# Patient Record
Sex: MALE | Race: WHITE | Employment: OTHER | URBAN - METROPOLITAN AREA
[De-identification: names, ages, dates, MRNs, and addresses within clinical notes are randomized per-mention and may not be internally consistent; named-entity substitution may affect disease eponyms.]

---

## 2022-01-05 ENCOUNTER — HOSPITAL ENCOUNTER (INPATIENT)
Age: 77
LOS: 7 days | Discharge: HOME | DRG: 177 | End: 2022-01-12
Attending: EMERGENCY MEDICINE | Admitting: HOSPITALIST
Payer: MEDICARE

## 2022-01-05 ENCOUNTER — APPOINTMENT (OUTPATIENT)
Dept: GENERAL RADIOLOGY | Age: 77
DRG: 177 | End: 2022-01-05
Attending: EMERGENCY MEDICINE
Payer: MEDICARE

## 2022-01-05 DIAGNOSIS — J96.01 ACUTE RESPIRATORY FAILURE WITH HYPOXIA (HCC): Primary | ICD-10-CM

## 2022-01-05 DIAGNOSIS — J12.82 PNEUMONIA DUE TO COVID-19 VIRUS: ICD-10-CM

## 2022-01-05 DIAGNOSIS — U07.1 PNEUMONIA DUE TO COVID-19 VIRUS: ICD-10-CM

## 2022-01-05 PROBLEM — E87.1 HYPONATREMIA: Status: ACTIVE | Noted: 2022-01-05

## 2022-01-05 PROBLEM — E87.6 HYPOKALEMIA: Status: ACTIVE | Noted: 2022-01-05

## 2022-01-05 PROBLEM — D69.6 THROMBOCYTOPENIA (HCC): Status: ACTIVE | Noted: 2022-01-05

## 2022-01-05 LAB
ALBUMIN SERPL-MCNC: 2.3 G/DL (ref 3.2–4.6)
ALBUMIN/GLOB SERPL: 0.6 {RATIO} (ref 1.2–3.5)
ALP SERPL-CCNC: 45 U/L (ref 50–136)
ALT SERPL-CCNC: 50 U/L (ref 12–65)
ANION GAP SERPL CALC-SCNC: 6 MMOL/L (ref 7–16)
AST SERPL-CCNC: 87 U/L (ref 15–37)
BASOPHILS # BLD: 0 K/UL (ref 0–0.2)
BASOPHILS NFR BLD: 0 % (ref 0–2)
BILIRUB SERPL-MCNC: 0.7 MG/DL (ref 0.2–1.1)
BUN SERPL-MCNC: 16 MG/DL (ref 8–23)
CALCIUM SERPL-MCNC: 7.9 MG/DL (ref 8.3–10.4)
CHLORIDE SERPL-SCNC: 105 MMOL/L (ref 98–107)
CO2 SERPL-SCNC: 23 MMOL/L (ref 21–32)
COVID-19 RAPID TEST, COVR: DETECTED
CREAT SERPL-MCNC: 0.96 MG/DL (ref 0.8–1.5)
CRP SERPL HS-MCNC: 87.6 MG/L
CRP SERPL-MCNC: 9 MG/DL (ref 0–0.9)
DIFFERENTIAL METHOD BLD: ABNORMAL
EOSINOPHIL # BLD: 0 K/UL (ref 0–0.8)
EOSINOPHIL NFR BLD: 0 % (ref 0.5–7.8)
ERYTHROCYTE [DISTWIDTH] IN BLOOD BY AUTOMATED COUNT: 12.4 % (ref 11.9–14.6)
GLOBULIN SER CALC-MCNC: 4 G/DL (ref 2.3–3.5)
GLUCOSE SERPL-MCNC: 106 MG/DL (ref 65–100)
HCT VFR BLD AUTO: 41.8 % (ref 41.1–50.3)
HGB BLD-MCNC: 14.6 G/DL (ref 13.6–17.2)
IMM GRANULOCYTES # BLD AUTO: 0 K/UL (ref 0–0.5)
IMM GRANULOCYTES NFR BLD AUTO: 1 % (ref 0–5)
LACTATE SERPL-SCNC: 1.7 MMOL/L (ref 0.4–2)
LYMPHOCYTES # BLD: 1.1 K/UL (ref 0.5–4.6)
LYMPHOCYTES NFR BLD: 18 % (ref 13–44)
MAGNESIUM SERPL-MCNC: 2.8 MG/DL (ref 1.8–2.4)
MCH RBC QN AUTO: 29.7 PG (ref 26.1–32.9)
MCHC RBC AUTO-ENTMCNC: 34.9 G/DL (ref 31.4–35)
MCV RBC AUTO: 85 FL (ref 79.6–97.8)
MONOCYTES # BLD: 0.7 K/UL (ref 0.1–1.3)
MONOCYTES NFR BLD: 12 % (ref 4–12)
NEUTS SEG # BLD: 4.3 K/UL (ref 1.7–8.2)
NEUTS SEG NFR BLD: 69 % (ref 43–78)
NRBC # BLD: 0 K/UL (ref 0–0.2)
PLATELET # BLD AUTO: 104 K/UL (ref 150–450)
PMV BLD AUTO: 11.5 FL (ref 9.4–12.3)
POTASSIUM SERPL-SCNC: 3.3 MMOL/L (ref 3.5–5.1)
PROCALCITONIN SERPL-MCNC: 0.15 NG/ML (ref 0–0.49)
PROT SERPL-MCNC: 6.3 G/DL (ref 6.3–8.2)
RBC # BLD AUTO: 4.92 M/UL (ref 4.23–5.6)
SODIUM SERPL-SCNC: 134 MMOL/L (ref 138–145)
SOURCE, COVRS: ABNORMAL
WBC # BLD AUTO: 6.2 K/UL (ref 4.3–11.1)

## 2022-01-05 PROCEDURE — 84145 PROCALCITONIN (PCT): CPT

## 2022-01-05 PROCEDURE — 74011000250 HC RX REV CODE- 250: Performed by: EMERGENCY MEDICINE

## 2022-01-05 PROCEDURE — 86140 C-REACTIVE PROTEIN: CPT

## 2022-01-05 PROCEDURE — 74011250637 HC RX REV CODE- 250/637: Performed by: HOSPITALIST

## 2022-01-05 PROCEDURE — 94640 AIRWAY INHALATION TREATMENT: CPT

## 2022-01-05 PROCEDURE — 71045 X-RAY EXAM CHEST 1 VIEW: CPT

## 2022-01-05 PROCEDURE — 74011000250 HC RX REV CODE- 250: Performed by: HOSPITALIST

## 2022-01-05 PROCEDURE — 99285 EMERGENCY DEPT VISIT HI MDM: CPT

## 2022-01-05 PROCEDURE — 74011250636 HC RX REV CODE- 250/636: Performed by: HOSPITALIST

## 2022-01-05 PROCEDURE — 86141 C-REACTIVE PROTEIN HS: CPT

## 2022-01-05 PROCEDURE — 77010033678 HC OXYGEN DAILY

## 2022-01-05 PROCEDURE — XW0DXM6 INTRODUCTION OF BARICITINIB INTO MOUTH AND PHARYNX, EXTERNAL APPROACH, NEW TECHNOLOGY GROUP 6: ICD-10-PCS | Performed by: FAMILY MEDICINE

## 2022-01-05 PROCEDURE — 83605 ASSAY OF LACTIC ACID: CPT

## 2022-01-05 PROCEDURE — 94664 DEMO&/EVAL PT USE INHALER: CPT

## 2022-01-05 PROCEDURE — 85025 COMPLETE CBC W/AUTO DIFF WBC: CPT

## 2022-01-05 PROCEDURE — 65270000029 HC RM PRIVATE

## 2022-01-05 PROCEDURE — 96374 THER/PROPH/DIAG INJ IV PUSH: CPT

## 2022-01-05 PROCEDURE — 83735 ASSAY OF MAGNESIUM: CPT

## 2022-01-05 PROCEDURE — 80053 COMPREHEN METABOLIC PANEL: CPT

## 2022-01-05 PROCEDURE — 74011000258 HC RX REV CODE- 258: Performed by: HOSPITALIST

## 2022-01-05 PROCEDURE — 87635 SARS-COV-2 COVID-19 AMP PRB: CPT

## 2022-01-05 PROCEDURE — 74011250636 HC RX REV CODE- 250/636: Performed by: EMERGENCY MEDICINE

## 2022-01-05 RX ORDER — ONDANSETRON 4 MG/1
4 TABLET, ORALLY DISINTEGRATING ORAL
Status: DISCONTINUED | OUTPATIENT
Start: 2022-01-05 | End: 2022-01-12 | Stop reason: HOSPADM

## 2022-01-05 RX ORDER — ACETAMINOPHEN 650 MG/1
650 SUPPOSITORY RECTAL
Status: DISCONTINUED | OUTPATIENT
Start: 2022-01-05 | End: 2022-01-12 | Stop reason: HOSPADM

## 2022-01-05 RX ORDER — DEXAMETHASONE 4 MG/1
6 TABLET ORAL DAILY
Status: DISCONTINUED | OUTPATIENT
Start: 2022-01-06 | End: 2022-01-12 | Stop reason: HOSPADM

## 2022-01-05 RX ORDER — ENOXAPARIN SODIUM 100 MG/ML
30 INJECTION SUBCUTANEOUS EVERY 12 HOURS
Status: DISCONTINUED | OUTPATIENT
Start: 2022-01-05 | End: 2022-01-05

## 2022-01-05 RX ORDER — GUAIFENESIN/DEXTROMETHORPHAN 100-10MG/5
5 SYRUP ORAL
Status: DISCONTINUED | OUTPATIENT
Start: 2022-01-05 | End: 2022-01-12 | Stop reason: HOSPADM

## 2022-01-05 RX ORDER — SODIUM CHLORIDE 0.9 % (FLUSH) 0.9 %
5-40 SYRINGE (ML) INJECTION EVERY 8 HOURS
Status: DISCONTINUED | OUTPATIENT
Start: 2022-01-05 | End: 2022-01-12 | Stop reason: HOSPADM

## 2022-01-05 RX ORDER — POLYETHYLENE GLYCOL 3350 17 G/17G
17 POWDER, FOR SOLUTION ORAL DAILY PRN
Status: DISCONTINUED | OUTPATIENT
Start: 2022-01-05 | End: 2022-01-12 | Stop reason: HOSPADM

## 2022-01-05 RX ORDER — DEXAMETHASONE SODIUM PHOSPHATE 100 MG/10ML
10 INJECTION INTRAMUSCULAR; INTRAVENOUS
Status: COMPLETED | OUTPATIENT
Start: 2022-01-05 | End: 2022-01-05

## 2022-01-05 RX ORDER — AZITHROMYCIN 250 MG/1
250 TABLET, FILM COATED ORAL DAILY
Status: COMPLETED | OUTPATIENT
Start: 2022-01-06 | End: 2022-01-09

## 2022-01-05 RX ORDER — FAMOTIDINE 20 MG/1
20 TABLET, FILM COATED ORAL 2 TIMES DAILY
Status: DISCONTINUED | OUTPATIENT
Start: 2022-01-05 | End: 2022-01-09

## 2022-01-05 RX ORDER — ONDANSETRON 2 MG/ML
4 INJECTION INTRAMUSCULAR; INTRAVENOUS
Status: DISCONTINUED | OUTPATIENT
Start: 2022-01-05 | End: 2022-01-12 | Stop reason: HOSPADM

## 2022-01-05 RX ORDER — ENOXAPARIN SODIUM 100 MG/ML
40 INJECTION SUBCUTANEOUS EVERY 24 HOURS
Status: DISCONTINUED | OUTPATIENT
Start: 2022-01-05 | End: 2022-01-12 | Stop reason: HOSPADM

## 2022-01-05 RX ORDER — IPRATROPIUM BROMIDE AND ALBUTEROL SULFATE 2.5; .5 MG/3ML; MG/3ML
3 SOLUTION RESPIRATORY (INHALATION)
Status: COMPLETED | OUTPATIENT
Start: 2022-01-05 | End: 2022-01-05

## 2022-01-05 RX ORDER — AZITHROMYCIN 250 MG/1
500 TABLET, FILM COATED ORAL ONCE
Status: COMPLETED | OUTPATIENT
Start: 2022-01-05 | End: 2022-01-05

## 2022-01-05 RX ORDER — SODIUM CHLORIDE 0.9 % (FLUSH) 0.9 %
5-40 SYRINGE (ML) INJECTION AS NEEDED
Status: DISCONTINUED | OUTPATIENT
Start: 2022-01-05 | End: 2022-01-12 | Stop reason: HOSPADM

## 2022-01-05 RX ORDER — ACETAMINOPHEN 325 MG/1
650 TABLET ORAL
Status: DISCONTINUED | OUTPATIENT
Start: 2022-01-05 | End: 2022-01-12 | Stop reason: HOSPADM

## 2022-01-05 RX ORDER — POTASSIUM CHLORIDE 20 MEQ/1
40 TABLET, EXTENDED RELEASE ORAL
Status: COMPLETED | OUTPATIENT
Start: 2022-01-05 | End: 2022-01-05

## 2022-01-05 RX ADMIN — CEFTRIAXONE 2 G: 2 INJECTION, POWDER, FOR SOLUTION INTRAMUSCULAR; INTRAVENOUS at 15:06

## 2022-01-05 RX ADMIN — IPRATROPIUM BROMIDE AND ALBUTEROL SULFATE 3 ML: .5; 3 SOLUTION RESPIRATORY (INHALATION) at 10:54

## 2022-01-05 RX ADMIN — FAMOTIDINE 20 MG: 20 TABLET ORAL at 17:47

## 2022-01-05 RX ADMIN — ENOXAPARIN SODIUM 40 MG: 100 INJECTION SUBCUTANEOUS at 16:14

## 2022-01-05 RX ADMIN — DEXAMETHASONE SODIUM PHOSPHATE 10 MG: 10 INJECTION INTRAMUSCULAR; INTRAVENOUS at 11:26

## 2022-01-05 RX ADMIN — POTASSIUM CHLORIDE 40 MEQ: 20 TABLET, EXTENDED RELEASE ORAL at 15:08

## 2022-01-05 RX ADMIN — SODIUM CHLORIDE, PRESERVATIVE FREE 10 ML: 5 INJECTION INTRAVENOUS at 15:08

## 2022-01-05 RX ADMIN — AZITHROMYCIN MONOHYDRATE 500 MG: 250 TABLET ORAL at 15:07

## 2022-01-05 RX ADMIN — BARICITINIB 4 MG: 2 TABLET, FILM COATED ORAL at 15:08

## 2022-01-05 RX ADMIN — SODIUM CHLORIDE, PRESERVATIVE FREE 10 ML: 5 INJECTION INTRAVENOUS at 21:36

## 2022-01-05 NOTE — ED TRIAGE NOTES
Pt arrived via EMS from Emergency [facility] with c/o shob and cough. COVID+ x 3 weeks. SpO 88% on RA. 96% on 6L. Temp 101.6 Pt was give tylenol and motrin.  EMS gave 1L NS. /86   Pt was dx with COVID Pna.

## 2022-01-05 NOTE — H&P
Hospitalist History and Physical   Admit Date:  2022 10:11 AM   Name:  Kartik Blackburn   Age:  68 y.o. Sex:  male  :  1945   MRN:  [id number]   [facility]    Presenting Complaint: Positive For Covid-19    Reason(s) for Admission: Acute hypoxemic respiratory failure due to COVID-19 ([facility] [U07.1, J96.01]     History of Present Illness:   Kartik Blackburn is a 68 y.o. male with no significant prior past medical history who presented to the ER from the emergency MD via EMS as he was found to be hypoxic with oxygen saturation of low 80s on room air with no history of prior lung disease. Patient reports he was diagnosed with COVID-19 infection on . He has not been vaccinated against COVID-19. No fever no chills. No chest pain no palpitations. He denies any shortness of breath but he was found to be hypoxic at emergency MD.  No nausea no vomiting no abdominal pain. No diarrhea. No tingling numbness or weakness of extremities. Patient states that he sees a primary care physician but has not been diagnosed with any medical problems. Near patient does not take any medications currently for any medical problems. Review of Systems:  10 systems reviewed and negative except as noted in HPI. ER course  Patient was found to be hypoxic and is currently needing 5 L oxygen nasal cannula to maintain saturations greater than 88%. Chest x-ray on admission shows multifocal airspace opacities. CBC remarkable for thrombocytopenia platelet count of 554. CMP remarkable for sodium of 134, potassium 3.3,.  Lactic acid 1.7. Procalcitonin 0.15. CRP 9. Repeat COVID test on admission is positive. Patient admitted for further evaluation management of acute hypoxemic respiratory failure secondary to COVID-19 pneumonia.     Assessment & Plan:     Principal Problem:     Acute hypoxemic respiratory failure due to COVID-19 [facility]) (2022)  Chest x-ray on admission shows multifocal airspace opacities. Patient is currently needing 5 L oxygen nasal cannula to maintain saturations greater than 88%. Not vaccinated against COVID-19. He tested positive for Covid on 12/22/21. Continue dexamethasone. Procalcitonin is 0.15. Due to multifocal opacities will treat with ceftriaxone, azithromycin. Patient meets criteria for baricitinib which was ordered. Check D-dimer. Home oxygen screen prior to discharge. Active Problems:    Hypokalemia (1/5/2022)  Potassium is 3.3. Repleted with p.o. potassium. Check magnesium level. Mild hyponatremia  Sodium of 134. Monitor for now. Thrombocytopenia mild  Platelet count of 862 on admission. No signs of active bleeding. Likely from infection. Monitor for now. Dispo/Discharge Planning:   Medical, inpatient. Anticipate hospital stay for 48 hours. Home oxygen screen prior to discharge. Diet: ADULT DIET Regular  VTE ppx: Lovenox  Code status: Full Code   DPOA: Pt's wife and son,   Pt's son Mr Gibson Line Problems as of 1/5/2022 Never Reviewed          Codes Class Noted - Resolved POA    * (Principal) Acute hypoxemic respiratory failure due to COVID-19 [facility]) ICD-10-CM: U07.1, J96.01  ICD-9-CM: 518.81, 079.89, 799.02  1/5/2022 - Present Unknown        Hypokalemia ICD-10-CM: E87.6  ICD-9-CM: 276.8  1/5/2022 - Present Unknown        Hyponatremia ICD-10-CM: E87.1  ICD-9-CM: 276.1  1/5/2022 - Present Unknown        Thrombocytopenia ([facility] ICD-10-CM: D69.6  ICD-9-CM: 287.5  1/5/2022 - Present Unknown              Past History:  No significant prior past medical history of hypertension or diabetes. No prior surgeries per patient  No past medical history on file. No past surgical history on file.    Allergies   Allergen Reactions    Lipitor [Atorvastatin] Rash      Social History     Tobacco Use    Smoking status: Not on file    Smokeless tobacco: Not on file   Substance Use Topics    Alcohol use: Not on file      No family history on file. Family history reviewed with the patient and no family history of hypertension or diabetes. There is no immunization history on file for this patient. Prior to Admit Medications:  No current outpatient medications    Objective:     Patient Vitals for the past 24 hrs:   Temp Pulse Resp BP SpO2   01/05/22 1055    (!) 118/56 95 %   01/05/22 1040    131/63 94 %   01/05/22 1025    137/63 92 %   01/05/22 1017    135/69 93 %   01/05/22 1013 98.4 °F (36.9 °C) 93 20  93 %     Oxygen Therapy  O2 Sat (%): 95 % (01/05/22 1055)  Pulse via Oximetry: 87 beats per minute (01/05/22 1055)  O2 Device: Nasal cannula (01/05/22 1055)  O2 Flow Rate (L/min): 5 l/min (01/05/22 1055)    Estimated body mass index is 38.74 kg/m² as calculated from the following:    Height as of this encounter: 5' 6\" (1.676 m). Weight as of this encounter: 108.9 kg (240 lb). No intake or output data in the 24 hours ending 01/05/22 1323      Physical Exam:    Blood pressure (!) 118/56, pulse 93, temperature 98.4 °F (36.9 °C), resp. rate 20, height 5' 6\" (1.676 m), weight 108.9 kg (240 lb), SpO2 95 %. General:    Well nourished. No overt distress on 5L Oxygen NC,   Head:  Normocephalic, atraumatic  Eyes:  Sclerae appear normal.  Pupils equally round. ENT:  Nares appear normal, no drainage. Moist oral mucosa  Neck:  No restricted ROM. Trachea midline   CV:   RRR. No m/r/g. No jugular venous distension. Lungs:   CTAB. No wheezing, rhonchi, or rales. Abdomen: Bowel sounds present. Soft, nontender, nondistended. Extremities: No cyanosis or clubbing. No edema  Skin:     No rashes and normal coloration. Warm and dry. Neuro:  CN II-XII grossly intact. Sensation intact. A&Ox3  Psych:  Normal mood and affect.       I have reviewed ordered lab tests and independently visualized imaging below:    Last 24hr Labs:  Recent Results (from the past 24 hour(s))   CBC WITH AUTOMATED DIFF    Collection Time: 01/05/22 10:27 AM   Result Value Ref Range    WBC 6.2 4.3 - 11.1 K/uL    RBC 4.92 4.23 - 5.6 M/uL    HGB 14.6 13.6 - 17.2 g/dL    HCT 41.8 41.1 - 50.3 %    MCV 85.0 79.6 - 97.8 FL    MCH 29.7 26.1 - 32.9 PG    MCHC 34.9 31.4 - 35.0 g/dL    RDW 12.4 11.9 - 14.6 %    PLATELET 999 (L) 159 - 450 K/uL    MPV 11.5 9.4 - 12.3 FL    ABSOLUTE NRBC 0.00 0.0 - 0.2 K/uL    DF AUTOMATED      NEUTROPHILS 69 43 - 78 %    LYMPHOCYTES 18 13 - 44 %    MONOCYTES 12 4.0 - 12.0 %    EOSINOPHILS 0 (L) 0.5 - 7.8 %    BASOPHILS 0 0.0 - 2.0 %    IMMATURE GRANULOCYTES 1 0.0 - 5.0 %    ABS. NEUTROPHILS 4.3 1.7 - 8.2 K/UL    ABS. LYMPHOCYTES 1.1 0.5 - 4.6 K/UL    ABS. MONOCYTES 0.7 0.1 - 1.3 K/UL    ABS. EOSINOPHILS 0.0 0.0 - 0.8 K/UL    ABS. BASOPHILS 0.0 0.0 - 0.2 K/UL    ABS. IMM. GRANS. 0.0 0.0 - 0.5 K/UL   METABOLIC PANEL, COMPREHENSIVE    Collection Time: 01/05/22 10:27 AM   Result Value Ref Range    Sodium 134 (L) 138 - 145 mmol/L    Potassium 3.3 (L) 3.5 - 5.1 mmol/L    Chloride 105 98 - 107 mmol/L    CO2 23 21 - 32 mmol/L    Anion gap 6 (L) 7 - 16 mmol/L    Glucose 106 (H) 65 - 100 mg/dL    BUN 16 8 - 23 MG/DL    Creatinine 0.96 0.8 - 1.5 MG/DL    GFR est AA >60 >60 ml/min/1.73m2    GFR est non-AA >60 >60 ml/min/1.73m2    Calcium 7.9 (L) 8.3 - 10.4 MG/DL    Bilirubin, total 0.7 0.2 - 1.1 MG/DL    ALT (SGPT) 50 12 - 65 U/L    AST (SGOT) 87 (H) 15 - 37 U/L    Alk.  phosphatase 45 (L) 50 - 136 U/L    Protein, total 6.3 6.3 - 8.2 g/dL    Albumin 2.3 (L) 3.2 - 4.6 g/dL    Globulin 4.0 (H) 2.3 - 3.5 g/dL    A-G Ratio 0.6 (L) 1.2 - 3.5     LACTIC ACID    Collection Time: 01/05/22 10:27 AM   Result Value Ref Range    Lactic acid 1.7 0.4 - 2.0 MMOL/L   CRP, HIGH SENSITIVITY    Collection Time: 01/05/22 10:27 AM   Result Value Ref Range    CRP, High sensitivity 87.6 mg/L   PROCALCITONIN    Collection Time: 01/05/22 10:27 AM   Result Value Ref Range    Procalcitonin 0.15 0.00 - 0.49 ng/mL   C REACTIVE PROTEIN, QT    Collection Time: 01/05/22 10:27 AM   Result Value Ref Range    C-Reactive protein 9.0 (H) 0.0 - 0.9 mg/dL   COVID-19 RAPID TEST    Collection Time: 01/05/22 11:28 AM   Result Value Ref Range    Specimen source NASAL      COVID-19 rapid test Detected (AA) NOTD         All Micro Results     Procedure Component Value Units Date/Time    COVID-19 RAPID TEST [[id number]]  (Abnormal) Collected: 01/05/22 1128    Order Status: Completed Specimen: Nasopharyngeal Updated: 01/05/22 1204     Specimen source NASAL        Comment: RAPID ONLY        COVID-19 rapid test Detected        Comment:      The specimen is POSITIVE for SARS-CoV-2, the novel coronavirus associated with COVID-19. This test has been authorized by the FDA under an Emergency Use Authorization (EUA) for use by authorized laboratories. Fact sheet for Healthcare Providers: ConventionSelectable Mediadate.co.nz  Fact sheet for Patients: ivWatchdate.co.nz       Methodology: Isothermal Nucleic Acid Amplification               Other Studies:  XR CHEST PORT    Result Date: 1/5/2022  EXAM: XR CHEST PORT INDICATION: shob COMPARISON: Chest radiograph, 1/5/2022 FINDINGS: The cardiomediastinal silhouette is enlarged, likely accentuated by technique. There are peripheral and bibasilar predominant airspace opacities. No pleural effusion. No pneumothorax. No acute osseous abnormality. Multifocal airspace opacities worrisome for a viral pneumonia. Medications Administered     albuterol-ipratropium (DUO-NEB) 2.5 MG-0.5 MG/3 ML     Admin Date  01/05/2022 Action  Given Dose  3 mL Route  Nebulization Administered By  Jaylin Rawls          dexamethasone (DECADRON) 10 mg/mL injection 10 mg     Admin Date  01/05/2022 Action  Given Dose  10 mg Route  IntraVENous Administered By  Meli Casillas RN                Signed:  Hazel Cardona MD    Part of this note may have been written by using a voice dictation software.   The note has been proof read but may still contain some grammatical/other typographical errors.

## 2022-01-05 NOTE — PROGRESS NOTES
TRANSFER - IN REPORT:    Verbal report received from [facility] (name) on Bebeto Harness  being received from ER (unit) for routine progression of care      Report consisted of patients Situation, Background, Assessment and   Recommendations(SBAR). Information from the following report(s) SBAR, Kardex, Intake/Output, MAR and Recent Results was reviewed with the receiving nurse. Opportunity for questions and clarification was provided. Assessment completed upon patients arrival to unit and care assumed.

## 2022-01-05 NOTE — ED NOTES
TRANSFER - OUT REPORT:    Verbal report given to Nancy Vazquez RN on Lillard Cranker  being transferred to [facility] [phone] for routine progression of care       Report consisted of patients Situation, Background, Assessment and   Recommendations(SBAR). Information from the following report(s) SBAR was reviewed with the receiving nurse. Lines:   Peripheral IV 01/05/22 Right Hand (Active)   Site Assessment Clean, dry, & intact 01/05/22 1022   Phlebitis Assessment 0 01/05/22 1022   Infiltration Assessment 0 01/05/22 1022   Dressing Status Clean, dry, & intact 01/05/22 1022   Dressing Type 4 X 4 01/05/22 1022   Hub Color/Line Status Green 01/05/22 1022        Opportunity for questions and clarification was provided.       Patient transported with:   O2 @ 5 liters

## 2022-01-05 NOTE — PROGRESS NOTES
Problem: Airway Clearance - Ineffective  Goal: Achieve or maintain patent airway  Outcome: Progressing Towards Goal     Problem: Gas Exchange - Impaired  Goal: Absence of hypoxia  Outcome: Progressing Towards Goal  Goal: Promote optimal lung function  Outcome: Progressing Towards Goal     Problem: Breathing Pattern - Ineffective  Goal: Ability to achieve and maintain a regular respiratory rate  Outcome: Progressing Towards Goal     Problem: Body Temperature -  Risk of, Imbalanced  Goal: Ability to maintain a body temperature within defined limits  Outcome: Progressing Towards Goal  Goal: Will regain or maintain usual level of consciousness  Outcome: Progressing Towards Goal  Goal: Complications related to the disease process, condition or treatment will be avoided or minimized  Outcome: Progressing Towards Goal     Problem: Isolation Precautions - Risk of Spread of Infection  Goal: Prevent transmission of infectious organism to others  Outcome: Progressing Towards Goal     Problem: Nutrition Deficits  Goal: Optimize nutrtional status  Outcome: Progressing Towards Goal     Problem: Risk for Fluid Volume Deficit  Goal: Maintain normal heart rhythm  Outcome: Progressing Towards Goal  Goal: Maintain absence of muscle cramping  Outcome: Progressing Towards Goal  Goal: Maintain normal serum potassium, sodium, calcium, phosphorus, and pH  Outcome: Progressing Towards Goal     Problem: Loneliness or Risk for Loneliness  Goal: Demonstrate positive use of time alone when socialization is not possible  Outcome: Progressing Towards Goal     Problem: Fatigue  Goal: Verbalize increase energy and improved vitality  Outcome: Progressing Towards Goal     Problem: Falls - Risk of  Goal: *Absence of Falls  Description: Document Jaqueline Fall Risk and appropriate interventions in the flowsheet.   Outcome: Progressing Towards Goal  Note: Fall Risk Interventions:            Medication Interventions: Teach patient to arise slowly

## 2022-01-05 NOTE — PROGRESS NOTES
01/05/22 1435   Dual Skin Pressure Injury Assessment   Dual Skin Pressure Injury Assessment WDL   Second Care Provider (Based on [address] [place] [address]) Lucas HIGUERA RN    Skin Integumentary   Skin Integumentary (WDL) X    Pressure  Injury Documentation No Pressure Injury Noted-Pressure Ulcer Prevention Initiated   Skin Color Appropriate for ethnicity   Skin Condition/Temp Dry; Warm   Skin Integrity Other (comment)  (small scab on L foot between 4/5th toes)   Nails X   Exceptions to WDL Thick

## 2022-01-05 NOTE — ED PROVIDER NOTES
66-year-old male presenting for worsening shortness of breath. Diagnosed with Covid on about 22 December which was 2-1/2 weeks ago. Began having shortness of breath about a week ago and has been progressively worsening. Went to emergency MD who did a chest x-ray and a Covid swab. Patient was in the low 80s on room air with no history of lung disease. Transported via EMS to the emergency department for further treatment management. Patient was given fluids and supplemental oxygen. The history is provided by the patient. Positive For Covid-19  Max temp prior to arrival:  102  Temp source:  Oral  Severity:  Severe  Onset quality:  Gradual  Duration:  2 weeks  Timing:  Constant  Progression:  Worsening  Chronicity:  New  Relieved by:  Nothing  Worsened by:  Nothing  Ineffective treatments:  None tried  Associated symptoms: congestion and cough         No past medical history on file. No past surgical history on file. No family history on file. Social History     Socioeconomic History    Marital status:      Spouse name: Not on file    Number of children: Not on file    Years of education: Not on file    Highest education level: Not on file   Occupational History    Not on file   Tobacco Use    Smoking status: Not on file    Smokeless tobacco: Not on file   Substance and Sexual Activity    Alcohol use: Not on file    Drug use: Not on file    Sexual activity: Not on file   Other Topics Concern    Not on file   Social History Narrative    Not on file     Social Determinants of Health     Financial Resource Strain:     Difficulty of Paying Living Expenses: Not on file   Food Insecurity:     Worried About Running Out of Food in the Last Year: Not on file    Eugenia of Food in the Last Year: Not on file   Transportation Needs:     Lack of Transportation (Medical): Not on file    Lack of Transportation (Non-Medical):  Not on file   Physical Activity:     Days of Exercise per Week: Not on file    Minutes of Exercise per Session: Not on file   Stress:     Feeling of Stress : Not on file   Social Connections:     Frequency of Communication with Friends and Family: Not on file    Frequency of Social Gatherings with Friends and Family: Not on file    Attends Sikhism Services: Not on file    Active Member of Clubs or Organizations: Not on file    Attends Club or Organization Meetings: Not on file    Marital Status: Not on file   Intimate Partner Violence:     Fear of Current or Ex-Partner: Not on file    Emotionally Abused: Not on file    Physically Abused: Not on file    Sexually Abused: Not on file   Housing Stability:     Unable to Pay for Housing in the Last Year: Not on file    Number of [place] in the Last Year: Not on file    Unstable Housing in the Last Year: Not on file         ALLERGIES: Lipitor [atorvastatin]    Review of Systems   Constitutional: Positive for activity change, appetite change, fatigue and fever. HENT: Positive for congestion. Respiratory: Positive for cough and shortness of breath. All other systems reviewed and are negative. Vitals:    01/05/22 1013 01/05/22 1017 01/05/22 1055   BP:  135/69    Pulse: 93     Resp: 20     Temp: 98.4 °F (36.9 °C)     SpO2: 93% 93% 95%   Weight: 108.9 kg (240 lb)     Height: 5' 6\" (1.676 m)              Physical Exam  Vitals and nursing note reviewed. Constitutional:       Appearance: He is well-developed. He is obese. HENT:      Head: Normocephalic and atraumatic. Nose: Congestion present. Eyes:      Conjunctiva/sclera: Conjunctivae normal.      Pupils: Pupils are equal, round, and reactive to light. Cardiovascular:      Rate and Rhythm: Normal rate and regular rhythm. Heart sounds: Normal heart sounds. Pulmonary:      Effort: Pulmonary effort is normal.      Breath sounds: Examination of the right-upper field reveals rales. Examination of the left-upper field reveals rales. Examination of the right-middle field reveals rales. Examination of the left-middle field reveals rales. Examination of the right-lower field reveals rales. Examination of the left-lower field reveals rales. Rales present. Abdominal:      General: Bowel sounds are normal.      Palpations: Abdomen is soft. Musculoskeletal:         General: No deformity. Normal range of motion. Cervical back: Normal range of motion and neck supple. Skin:     General: Skin is warm and dry. Neurological:      Mental Status: He is alert and oriented to person, place, and time. Cranial Nerves: No cranial nerve deficit. Psychiatric:         Behavior: Behavior normal.          MDM  Number of Diagnoses or Management Options  Diagnosis management comments: 27-year-old male presenting for acute shortness of breath. Work-up consistent with COVID-19 pneumonia. Decadron given here in the emergency department. Breathing treatment with some improvement. He is on 6 L nasal cannula with no history of lung disease. He is not vaccinated.        Amount and/or Complexity of Data Reviewed  Clinical lab tests: ordered and reviewed (Results for orders placed or performed during the hospital encounter of 01/05/22  -CBC WITH AUTOMATED DIFF:        Result                      Value             Ref Range           WBC                         6.2               4.3 - 11.1 K*       RBC                         4.92              4.23 - 5.6 M*       HGB                         14.6              13.6 - 17.2 *       HCT                         41.8              41.1 - 50.3 %       MCV                         85.0              79.6 - 97.8 *       MCH                         29.7              26.1 - 32.9 *       MCHC                        34.9              31.4 - 35.0 *       RDW                         12.4              11.9 - 14.6 %       PLATELET                    104 (L)           150 - 450 K/*       MPV                         11.5 9.4 - 12.3 FL       ABSOLUTE NRBC               0.00              0.0 - 0.2 K/*       DF                          AUTOMATED                             NEUTROPHILS                 69                43 - 78 %           LYMPHOCYTES                 18                13 - 44 %           MONOCYTES                   12                4.0 - 12.0 %        EOSINOPHILS                 0 (L)             0.5 - 7.8 %         BASOPHILS                   0                 0.0 - 2.0 %         IMMATURE GRANULOCYTES       1                 0.0 - 5.0 %         ABS. NEUTROPHILS            4.3               1.7 - 8.2 K/*       ABS. LYMPHOCYTES            1.1               0.5 - 4.6 K/*       ABS. MONOCYTES              0.7               0.1 - 1.3 K/*       ABS. EOSINOPHILS            0.0               0.0 - 0.8 K/*       ABS. BASOPHILS              0.0               0.0 - 0.2 K/*       ABS. IMM.  GRANS.            0.0               0.0 - 0.5 K/*  -METABOLIC PANEL, COMPREHENSIVE:        Result                      Value             Ref Range           Sodium                      134 (L)           138 - 145 mm*       Potassium                   3.3 (L)           3.5 - 5.1 mm*       Chloride                    105               98 - 107 mmo*       CO2                         23                21 - 32 mmol*       Anion gap                   6 (L)             7 - 16 mmol/L       Glucose                     106 (H)           65 - 100 mg/*       BUN                         16                8 - 23 MG/DL        Creatinine                  0.96              0.8 - 1.5 MG*       GFR est AA                  >60               >60 ml/min/1*       GFR est non-AA              >60               >60 ml/min/1*       Calcium                     7.9 (L)           8.3 - 10.4 M*       Bilirubin, total            0.7               0.2 - 1.1 MG*       ALT (SGPT)                  50                12 - 65 U/L         AST (SGOT)                  87 (H) 15 - 37 U/L         Alk. phosphatase            45 (L)            50 - 136 U/L        Protein, total              6.3               6.3 - 8.2 g/*       Albumin                     2.3 (L)           3.2 - 4.6 g/*       Globulin                    4.0 (H)           2.3 - 3.5 g/*       A-G Ratio                   0.6 (L)           1.2 - 3.5      -LACTIC ACID:        Result                      Value             Ref Range           Lactic acid                 1.7               0.4 - 2.0 MM*  -CRP, HIGH SENSITIVITY:        Result                      Value             Ref Range           CRP, High sensitivity       87.6              mg/L           )  Tests in the radiology section of CPT®: ordered and reviewed (XR CHEST PORT    Result Date: 1/5/2022  EXAM: XR CHEST PORT INDICATION: shob COMPARISON: Chest radiograph, 1/5/2022 FINDINGS: The cardiomediastinal silhouette is enlarged, likely accentuated by technique. There are peripheral and bibasilar predominant airspace opacities. No pleural effusion. No pneumothorax. No acute osseous abnormality. Multifocal airspace opacities worrisome for a viral pneumonia.      )  Tests in the medicine section of CPT®: ordered and reviewed  Discuss the patient with other providers: yes (Consulting hospitalist for further management.)  Independent visualization of images, tracings, or specimens: yes (Reviewed chest x-ray)    Risk of Complications, Morbidity, and/or Mortality  Presenting problems: high  Diagnostic procedures: high  Management options: high  General comments: I personally reviewed the patient's vital signs, laboratory tests, and/or radiological findings. I discussed these findings with the patient and their significance. I answered all questions and explained that given these findings there is significant concern for increased morbidity and/or mortality without immediate intervention.   As a result, I recommended admission to the hospital, consulted the appropriate service, and transitioned care to that service in improved condition      Patient Progress  Patient progress: stable         Procedures

## 2022-01-06 LAB
ALBUMIN SERPL-MCNC: 2.6 G/DL (ref 3.2–4.6)
ALBUMIN/GLOB SERPL: 0.5 {RATIO} (ref 1.2–3.5)
ALP SERPL-CCNC: 54 U/L (ref 50–136)
ALT SERPL-CCNC: 57 U/L (ref 12–65)
ANION GAP SERPL CALC-SCNC: 9 MMOL/L (ref 7–16)
AST SERPL-CCNC: 85 U/L (ref 15–37)
BASOPHILS # BLD: 0 K/UL (ref 0–0.2)
BASOPHILS NFR BLD: 0 % (ref 0–2)
BILIRUB SERPL-MCNC: 0.5 MG/DL (ref 0.2–1.1)
BUN SERPL-MCNC: 20 MG/DL (ref 8–23)
CALCIUM SERPL-MCNC: 8.9 MG/DL (ref 8.3–10.4)
CHLORIDE SERPL-SCNC: 106 MMOL/L (ref 98–107)
CO2 SERPL-SCNC: 24 MMOL/L (ref 21–32)
CREAT SERPL-MCNC: 1.06 MG/DL (ref 0.8–1.5)
CRP SERPL-MCNC: 12 MG/DL (ref 0–0.9)
D DIMER PPP FEU-MCNC: 1.88 UG/ML(FEU)
DIFFERENTIAL METHOD BLD: ABNORMAL
EOSINOPHIL # BLD: 0 K/UL (ref 0–0.8)
EOSINOPHIL NFR BLD: 0 % (ref 0.5–7.8)
ERYTHROCYTE [DISTWIDTH] IN BLOOD BY AUTOMATED COUNT: 12.6 % (ref 11.9–14.6)
GLOBULIN SER CALC-MCNC: 5.3 G/DL (ref 2.3–3.5)
GLUCOSE SERPL-MCNC: 131 MG/DL (ref 65–100)
HCT VFR BLD AUTO: 48.4 % (ref 41.1–50.3)
HGB BLD-MCNC: 16.4 G/DL (ref 13.6–17.2)
IMM GRANULOCYTES # BLD AUTO: 0 K/UL (ref 0–0.5)
IMM GRANULOCYTES NFR BLD AUTO: 0 % (ref 0–5)
LYMPHOCYTES # BLD: 1.2 K/UL (ref 0.5–4.6)
LYMPHOCYTES NFR BLD: 27 % (ref 13–44)
MCH RBC QN AUTO: 29 PG (ref 26.1–32.9)
MCHC RBC AUTO-ENTMCNC: 33.9 G/DL (ref 31.4–35)
MCV RBC AUTO: 85.5 FL (ref 79.6–97.8)
MONOCYTES # BLD: 0.5 K/UL (ref 0.1–1.3)
MONOCYTES NFR BLD: 11 % (ref 4–12)
NEUTS SEG # BLD: 2.8 K/UL (ref 1.7–8.2)
NEUTS SEG NFR BLD: 62 % (ref 43–78)
NRBC # BLD: 0 K/UL (ref 0–0.2)
PLATELET # BLD AUTO: 126 K/UL (ref 150–450)
PLATELET COMMENTS,PCOM: SLIGHT
PMV BLD AUTO: 11.4 FL (ref 9.4–12.3)
POTASSIUM SERPL-SCNC: 3.8 MMOL/L (ref 3.5–5.1)
PROT SERPL-MCNC: 7.9 G/DL (ref 6.3–8.2)
RBC # BLD AUTO: 5.66 M/UL (ref 4.23–5.6)
RBC MORPH BLD: ABNORMAL
SODIUM SERPL-SCNC: 139 MMOL/L (ref 136–145)
WBC # BLD AUTO: 4.5 K/UL (ref 4.3–11.1)
WBC MORPH BLD: ABNORMAL

## 2022-01-06 PROCEDURE — 74011000250 HC RX REV CODE- 250: Performed by: HOSPITALIST

## 2022-01-06 PROCEDURE — 36415 COLL VENOUS BLD VENIPUNCTURE: CPT

## 2022-01-06 PROCEDURE — 74011250636 HC RX REV CODE- 250/636: Performed by: HOSPITALIST

## 2022-01-06 PROCEDURE — 74011250637 HC RX REV CODE- 250/637: Performed by: HOSPITALIST

## 2022-01-06 PROCEDURE — 97535 SELF CARE MNGMENT TRAINING: CPT

## 2022-01-06 PROCEDURE — 85379 FIBRIN DEGRADATION QUANT: CPT

## 2022-01-06 PROCEDURE — 97165 OT EVAL LOW COMPLEX 30 MIN: CPT

## 2022-01-06 PROCEDURE — 74011000258 HC RX REV CODE- 258: Performed by: HOSPITALIST

## 2022-01-06 PROCEDURE — 97161 PT EVAL LOW COMPLEX 20 MIN: CPT

## 2022-01-06 PROCEDURE — 86140 C-REACTIVE PROTEIN: CPT

## 2022-01-06 PROCEDURE — 80053 COMPREHEN METABOLIC PANEL: CPT

## 2022-01-06 PROCEDURE — 85025 COMPLETE CBC W/AUTO DIFF WBC: CPT

## 2022-01-06 PROCEDURE — 65270000029 HC RM PRIVATE

## 2022-01-06 PROCEDURE — 97530 THERAPEUTIC ACTIVITIES: CPT

## 2022-01-06 RX ORDER — HYDRALAZINE HYDROCHLORIDE 20 MG/ML
10 INJECTION INTRAMUSCULAR; INTRAVENOUS
Status: DISCONTINUED | OUTPATIENT
Start: 2022-01-06 | End: 2022-01-12 | Stop reason: HOSPADM

## 2022-01-06 RX ORDER — SAME BUTANEDISULFONATE/BETAINE 400-600 MG
500 POWDER IN PACKET (EA) ORAL 2 TIMES DAILY
Status: DISCONTINUED | OUTPATIENT
Start: 2022-01-06 | End: 2022-01-12 | Stop reason: HOSPADM

## 2022-01-06 RX ORDER — SODIUM CHLORIDE, SODIUM LACTATE, POTASSIUM CHLORIDE, CALCIUM CHLORIDE 600; 310; 30; 20 MG/100ML; MG/100ML; MG/100ML; MG/100ML
50 INJECTION, SOLUTION INTRAVENOUS CONTINUOUS
Status: DISPENSED | OUTPATIENT
Start: 2022-01-06 | End: 2022-01-06

## 2022-01-06 RX ADMIN — ENOXAPARIN SODIUM 40 MG: 100 INJECTION SUBCUTANEOUS at 14:27

## 2022-01-06 RX ADMIN — DEXAMETHASONE 6 MG: 4 TABLET ORAL at 08:02

## 2022-01-06 RX ADMIN — FAMOTIDINE 20 MG: 20 TABLET ORAL at 16:59

## 2022-01-06 RX ADMIN — GUAIFENESIN SYRUP AND DEXTROMETHORPHAN 5 ML: 100; 10 SYRUP ORAL at 14:45

## 2022-01-06 RX ADMIN — Medication 500 MG: at 10:09

## 2022-01-06 RX ADMIN — SODIUM CHLORIDE, SODIUM LACTATE, POTASSIUM CHLORIDE, AND CALCIUM CHLORIDE 50 ML/HR: 600; 310; 30; 20 INJECTION, SOLUTION INTRAVENOUS at 10:08

## 2022-01-06 RX ADMIN — BARICITINIB 4 MG: 2 TABLET, FILM COATED ORAL at 08:02

## 2022-01-06 RX ADMIN — CEFTRIAXONE 2 G: 2 INJECTION, POWDER, FOR SOLUTION INTRAMUSCULAR; INTRAVENOUS at 13:03

## 2022-01-06 RX ADMIN — Medication 500 MG: at 16:59

## 2022-01-06 RX ADMIN — SODIUM CHLORIDE, PRESERVATIVE FREE 10 ML: 5 INJECTION INTRAVENOUS at 15:12

## 2022-01-06 RX ADMIN — SODIUM CHLORIDE, PRESERVATIVE FREE 10 ML: 5 INJECTION INTRAVENOUS at 20:29

## 2022-01-06 RX ADMIN — SODIUM CHLORIDE, PRESERVATIVE FREE 10 ML: 5 INJECTION INTRAVENOUS at 06:14

## 2022-01-06 RX ADMIN — FAMOTIDINE 20 MG: 20 TABLET ORAL at 08:03

## 2022-01-06 RX ADMIN — AZITHROMYCIN MONOHYDRATE 250 MG: 250 TABLET ORAL at 08:03

## 2022-01-06 NOTE — PROGRESS NOTES
Hospitalist Progress Note   Admit Date:  2022 10:11 AM   Name:  Grady Records   Age:  68 y.o. Sex:  male  :  1945   MRN:  [id number]   Room:  [facility]    Presenting Complaint: Positive For Covid-19    Reason(s) for Admission: Acute hypoxemic respiratory failure due to COVID-19 ([facility] [U07.1, J96.01]     Hospital Course & Interval History: This is a 14-year-old male with no significant prior past medical history presented to ER from emergency MD via EMS as he was found hypoxic with oxygen saturation of low 80s on room air. He was diagnosed with COVID-19 infection on . Patient was admitted for acute hypoxemic respiratory failure secondary to COVID-19 pneumonia. No fever no chills. Subjective (22): Patient is still needing 5 to 6 L oxygen high flow nasal cannula. No chest pain. No fever no chills. No nausea no vomiting. Patient complained of diarrhea this morning. Assessment & Plan:     Principal Problem:    Acute hypoxemic respiratory failure due to COVID-19 [facility]) (2022)    Chest x-ray on admission shows multifocal airspace opacities. Patient is currently needing 5-6 L oxygen nasal cannula to maintain saturations greater than 88%. Not vaccinated against COVID-19. He tested positive for Covid on 21. Continue dexamethasone. Procalcitonin is 0.15. Continue Ceftriaxone, azithromycin, Baricitinib. D-dimer is 1.88  CRP is 12. Home oxygen screen prior to discharge. Ordered gentle fluids due to diarrhea. Hypokalemia (2022)  Resolved     Mild hyponatremia  Resolved.     Thrombocytopenia mild  Platelet count of 027 on admission. This am, 126. No signs of active bleeding. Likely from infection. Monitor for now. Mild transaminitis:  No abdominal pain. Likely from [place] 19. Recheck CMP in am.        Dispo/Discharge Planning:    Anticipate inpatient hospital stay for 48 hours.   Home oxygen screen prior to discharge when oxygen requirements improved. Diet:  ADULT DIET Regular; Low Fat/Low Chol/High Fiber/2 gm Na  DVT PPx: Lovenox  Code status: Full Code    Hospital Problems as of 1/6/2022 Never Reviewed          Codes Class Noted - Resolved POA    * (Principal) Acute hypoxemic respiratory failure due to COVID-19 [facility]) ICD-10-CM: U07.1, J96.01  ICD-9-CM: 518.81, 079.89, 799.02  1/5/2022 - Present Unknown        Hypokalemia ICD-10-CM: E87.6  ICD-9-CM: 276.8  1/5/2022 - Present Unknown        Hyponatremia ICD-10-CM: E87.1  ICD-9-CM: 276.1  1/5/2022 - Present Unknown        Thrombocytopenia ([facility] ICD-10-CM: D69.6  ICD-9-CM: 287.5  1/5/2022 - Present Unknown              Objective:     Patient Vitals for the past 24 hrs:   Temp Pulse Resp BP SpO2   01/06/22 1145 97.6 °F (36.4 °C) 78 18 (!) 142/85 94 %   01/06/22 0800 97.9 °F (36.6 °C) 83 20 (!) 150/84 92 %   01/06/22 0332  68 12 (!) 148/98 96 %   01/05/22 2342 97.5 °F (36.4 °C) 74 13 136/73    01/05/22 1947 97.5 °F (36.4 °C) 80 14 128/78 95 %   01/05/22 1802 (!) 96.5 °F (35.8 °C) 77  (!) 142/86 95 %   01/05/22 1431 97.7 °F (36.5 °C) 79 22 132/76 96 %     Oxygen Therapy  O2 Sat (%): 94 % (01/06/22 1145)  Pulse via Oximetry: 87 beats per minute (01/05/22 1055)  O2 Device: Nasal cannula (01/06/22 1300)  O2 Flow Rate (L/min): 6 l/min (01/06/22 1300)    Estimated body mass index is 38.74 kg/m² as calculated from the following:    Height as of this encounter: 5' 6\" (1.676 m). Weight as of this encounter: 108.9 kg (240 lb). Intake/Output Summary (Last 24 hours) at 1/6/2022 1336  Last data filed at 1/6/2022 0805  Gross per 24 hour   Intake 460 ml   Output    Net 460 ml         Physical Exam:     Blood pressure (!) 142/85, pulse 78, temperature 97.6 °F (36.4 °C), resp. rate 18, height 5' 6\" (1.676 m), weight 108.9 kg (240 lb), SpO2 94 %. General:    Well nourished. No overt distress on 5-6 L HFNC  Head:  Normocephalic, atraumatic  Eyes:  Sclerae appear normal.  Pupils equally round.   ENT:  Nares appear normal, no drainage. Moist oral mucosa  Neck:  No restricted ROM. Trachea midline   CV:   RRR. No m/r/g. No jugular venous distension. Lungs:   CTAB. No wheezing, rhonchi, or rales. Abdomen: Bowel sounds present. Soft, nontender, nondistended. Extremities: No cyanosis or clubbing. No edema  Skin:     No rashes and normal coloration. Warm and dry. Neuro:  CN II-XII grossly intact. Sensation intact. A&Ox3  Psych:  Normal mood and affect. I have reviewed ordered lab tests and independently visualized imaging below:    Recent Labs:  Recent Results (from the past 48 hour(s))   CBC WITH AUTOMATED DIFF    Collection Time: 01/05/22 10:27 AM   Result Value Ref Range    WBC 6.2 4.3 - 11.1 K/uL    RBC 4.92 4.23 - 5.6 M/uL    HGB 14.6 13.6 - 17.2 g/dL    HCT 41.8 41.1 - 50.3 %    MCV 85.0 79.6 - 97.8 FL    MCH 29.7 26.1 - 32.9 PG    MCHC 34.9 31.4 - 35.0 g/dL    RDW 12.4 11.9 - 14.6 %    PLATELET 530 (L) 062 - 450 K/uL    MPV 11.5 9.4 - 12.3 FL    ABSOLUTE NRBC 0.00 0.0 - 0.2 K/uL    DF AUTOMATED      NEUTROPHILS 69 43 - 78 %    LYMPHOCYTES 18 13 - 44 %    MONOCYTES 12 4.0 - 12.0 %    EOSINOPHILS 0 (L) 0.5 - 7.8 %    BASOPHILS 0 0.0 - 2.0 %    IMMATURE GRANULOCYTES 1 0.0 - 5.0 %    ABS. NEUTROPHILS 4.3 1.7 - 8.2 K/UL    ABS. LYMPHOCYTES 1.1 0.5 - 4.6 K/UL    ABS. MONOCYTES 0.7 0.1 - 1.3 K/UL    ABS. EOSINOPHILS 0.0 0.0 - 0.8 K/UL    ABS. BASOPHILS 0.0 0.0 - 0.2 K/UL    ABS. IMM.  GRANS. 0.0 0.0 - 0.5 K/UL   METABOLIC PANEL, COMPREHENSIVE    Collection Time: 01/05/22 10:27 AM   Result Value Ref Range    Sodium 134 (L) 138 - 145 mmol/L    Potassium 3.3 (L) 3.5 - 5.1 mmol/L    Chloride 105 98 - 107 mmol/L    CO2 23 21 - 32 mmol/L    Anion gap 6 (L) 7 - 16 mmol/L    Glucose 106 (H) 65 - 100 mg/dL    BUN 16 8 - 23 MG/DL    Creatinine 0.96 0.8 - 1.5 MG/DL    GFR est AA >60 >60 ml/min/1.73m2    GFR est non-AA >60 >60 ml/min/1.73m2    Calcium 7.9 (L) 8.3 - 10.4 MG/DL    Bilirubin, total 0.7 0.2 - 1.1 MG/DL    ALT (SGPT) 50 12 - 65 U/L    AST (SGOT) 87 (H) 15 - 37 U/L    Alk. phosphatase 45 (L) 50 - 136 U/L    Protein, total 6.3 6.3 - 8.2 g/dL    Albumin 2.3 (L) 3.2 - 4.6 g/dL    Globulin 4.0 (H) 2.3 - 3.5 g/dL    A-G Ratio 0.6 (L) 1.2 - 3.5     LACTIC ACID    Collection Time: 01/05/22 10:27 AM   Result Value Ref Range    Lactic acid 1.7 0.4 - 2.0 MMOL/L   CRP, HIGH SENSITIVITY    Collection Time: 01/05/22 10:27 AM   Result Value Ref Range    CRP, High sensitivity 87.6 mg/L   PROCALCITONIN    Collection Time: 01/05/22 10:27 AM   Result Value Ref Range    Procalcitonin 0.15 0.00 - 0.49 ng/mL   C REACTIVE PROTEIN, QT    Collection Time: 01/05/22 10:27 AM   Result Value Ref Range    C-Reactive protein 9.0 (H) 0.0 - 0.9 mg/dL   MAGNESIUM    Collection Time: 01/05/22 10:27 AM   Result Value Ref Range    Magnesium 2.8 (H) 1.8 - 2.4 mg/dL   COVID-19 RAPID TEST    Collection Time: 01/05/22 11:28 AM   Result Value Ref Range    Specimen source NASAL      COVID-19 rapid test Detected (AA) NOTD     METABOLIC PANEL, COMPREHENSIVE    Collection Time: 01/06/22  6:38 AM   Result Value Ref Range    Sodium 139 136 - 145 mmol/L    Potassium 3.8 3.5 - 5.1 mmol/L    Chloride 106 98 - 107 mmol/L    CO2 24 21 - 32 mmol/L    Anion gap 9 7 - 16 mmol/L    Glucose 131 (H) 65 - 100 mg/dL    BUN 20 8 - 23 MG/DL    Creatinine 1.06 0.8 - 1.5 MG/DL    GFR est AA >60 >60 ml/min/1.73m2    GFR est non-AA >60 >60 ml/min/1.73m2    Calcium 8.9 8.3 - 10.4 MG/DL    Bilirubin, total 0.5 0.2 - 1.1 MG/DL    ALT (SGPT) 57 12 - 65 U/L    AST (SGOT) 85 (H) 15 - 37 U/L    Alk.  phosphatase 54 50 - 136 U/L    Protein, total 7.9 6.3 - 8.2 g/dL    Albumin 2.6 (L) 3.2 - 4.6 g/dL    Globulin 5.3 (H) 2.3 - 3.5 g/dL    A-G Ratio 0.5 (L) 1.2 - 3.5     C REACTIVE PROTEIN, QT    Collection Time: 01/06/22  6:38 AM   Result Value Ref Range    C-Reactive protein 12.0 (H) 0.0 - 0.9 mg/dL   D DIMER    Collection Time: 01/06/22  6:38 AM   Result Value Ref Range    D DIMER 1.88 (H) <0.56 ug/ml(FEU)   CBC WITH AUTOMATED DIFF    Collection Time: 01/06/22  8:48 AM   Result Value Ref Range    WBC 4.5 4.3 - 11.1 K/uL    RBC 5.66 (H) 4.23 - 5.6 M/uL    HGB 16.4 13.6 - 17.2 g/dL    HCT 48.4 41.1 - 50.3 %    MCV 85.5 79.6 - 97.8 FL    MCH 29.0 26.1 - 32.9 PG    MCHC 33.9 31.4 - 35.0 g/dL    RDW 12.6 11.9 - 14.6 %    PLATELET 672 (L) 696 - 450 K/uL    MPV 11.4 9.4 - 12.3 FL    ABSOLUTE NRBC 0.00 0.0 - 0.2 K/uL    NEUTROPHILS 62 43 - 78 %    LYMPHOCYTES 27 13 - 44 %    MONOCYTES 11 4.0 - 12.0 %    EOSINOPHILS 0 (L) 0.5 - 7.8 %    BASOPHILS 0 0.0 - 2.0 %    IMMATURE GRANULOCYTES 0 0.0 - 5.0 %    ABS. NEUTROPHILS 2.8 1.7 - 8.2 K/UL    ABS. LYMPHOCYTES 1.2 0.5 - 4.6 K/UL    ABS. MONOCYTES 0.5 0.1 - 1.3 K/UL    ABS. EOSINOPHILS 0.0 0.0 - 0.8 K/UL    ABS. BASOPHILS 0.0 0.0 - 0.2 K/UL    ABS. IMM. GRANS. 0.0 0.0 - 0.5 K/UL    RBC COMMENTS NORMOCYTIC/NORMOCHROMIC      WBC COMMENTS OCCASIONAL      PLATELET COMMENTS SLIGHT      DF AUTOMATED         All Micro Results     Procedure Component Value Units Date/Time    COVID-19 RAPID TEST [[id number]]  (Abnormal) Collected: 01/05/22 1128    Order Status: Completed Specimen: Nasopharyngeal Updated: 01/05/22 1204     Specimen source NASAL        Comment: RAPID ONLY        COVID-19 rapid test Detected        Comment:      The specimen is POSITIVE for SARS-CoV-2, the novel coronavirus associated with COVID-19. This test has been authorized by the FDA under an Emergency Use Authorization (EUA) for use by authorized laboratories. Fact sheet for Healthcare Providers: ConventionUpdate.co.nz  Fact sheet for Patients: ConventionUpdate.co.nz       Methodology: Isothermal Nucleic Acid Amplification               Other Studies:  No results found.     Current Meds:  Current Facility-Administered Medications   Medication Dose Route Frequency    Saccharomyces boulardii (FLORASTOR) capsule 500 mg  500 mg Oral BID    lactated Ringers infusion  50 mL/hr IntraVENous CONTINUOUS    sodium chloride (NS) flush 5-40 mL  5-40 mL IntraVENous Q8H    sodium chloride (NS) flush 5-40 mL  5-40 mL IntraVENous PRN    acetaminophen (TYLENOL) tablet 650 mg  650 mg Oral Q6H PRN    Or    acetaminophen (TYLENOL) suppository 650 mg  650 mg Rectal Q6H PRN    polyethylene glycol (MIRALAX) packet 17 g  17 g Oral DAILY PRN    ondansetron (ZOFRAN ODT) tablet 4 mg  4 mg Oral Q8H PRN    Or    ondansetron (ZOFRAN) injection 4 mg  4 mg IntraVENous Q6H PRN    famotidine (PEPCID) tablet 20 mg  20 mg Oral BID    guaiFENesin-dextromethorphan (ROBITUSSIN DM) 100-10 mg/5 mL syrup 5 mL  5 mL Oral Q4H PRN    dexAMETHasone (DECADRON) tablet 6 mg  6 mg Oral DAILY    cefTRIAXone (ROCEPHIN) 2 g in 0.9% sodium chloride (MBP/ADV) 50 mL MBP  2 g IntraVENous Q24H    azithromycin (ZITHROMAX) tablet 250 mg  250 mg Oral DAILY    baricitinib (OLUMIANT) tablet 4 mg  4 mg Oral DAILY    enoxaparin (LOVENOX) injection 40 mg  40 mg SubCUTAneous Q24H    influenza vaccine 2021-22 (6 mos+)(PF) (FLUARIX/FLULAVAL/FLUZONE QUAD) injection 0.5 mL  1 Each IntraMUSCular PRIOR TO DISCHARGE       Signed:  Jimenez Torre MD    Part of this note may have been written by using a voice dictation software. The note has been proof read but may still contain some grammatical/other typographical errors.

## 2022-01-06 NOTE — PROGRESS NOTES
's visit via telephone call attempted. The call was unanswered. Chaplains remain available follow-up.      Diana Haines MDIV, [facility]

## 2022-01-06 NOTE — PROGRESS NOTES
ACUTE OT GOALS:  (Developed with and agreed upon by patient and/or caregiver.)  1) Patient will complete lower body bathing and dressing with Mod I and adaptive equipment as needed. 2) Patient will complete toileting with independence. 3) Patient will complete functional transfers with independence and adaptive equipment as needed. 4) Patient will tolerate at least 23 minutes of OT activity with less than 2 rest breaks while maintaining O2 sats >90%. 5) Patient will verbalize at least 3 energy conservation technique to utilize during ADL/IADL.    Timeframe: 7 visits     OCCUPATIONAL THERAPY ASSESSMENT: Initial Assessment, Daily Note and AM    OT Treatment Day # 1    Erica Eldridge is a 68 y.o. male   PRIMARY DIAGNOSIS: Acute hypoxemic respiratory failure due to COVID-19 [facility])  Acute hypoxemic respiratory failure due [facility] COVID[facility] [U07.1, J96.01]       Reason for Referral:    ICD-10: Treatment Diagnosis: Generalized Muscle Weakness (M62.81)  Other lack of cordination (R27.8)  INPATIENT: Payor: MEDICAL [facility] / Plan: [facility] MEDICAL [address] / Product Type: Commerical /   ASSESSMENT:     REHAB RECOMMENDATIONS:   Recommendation to date pending progress:  Setting:   No further skilled therapy    versus home health pending progress  Equipment:    None     PRIOR LEVEL OF FUNCTION:  (Prior to Hospitalization)  INITIAL/CURRENT LEVEL OF FUNCTION:  (Based on today's evaluation)   Bathing:   Independent  Dressing:   Independent  Feeding/Grooming:   Independent  Toileting:   Independent  Functional Mobility:   Independent Bathing:   Contact Guard Assistance  Dressin [address]  Feeding/Grooming:   Set Up  Toileting:   Contact Guard Assistance  Functional Mobility:  [address] for lower level transfers     ASSESSMENT:  Mr. Britta Escobar is a 67 YO R dominant WM admitted from home with increased SOB and oxygen sat levels dropping, found to have COVID-19. BP has been elevated, coughing and been dizzy with movement. PT staff had already worked with pt and reported elevated BPs and desaturating with ambulating in room, so Oxygen was increased from 5 to 6L NC.  RN cautioned OT about stressing pt out, so when pt asked for shower, OT declined, but pt really wants to shower. Completed sponge bath in sitting on EOB then standing for pericare. Doffed dirty underwear but has nothing clean to replace with. Min A to get underwear off his feet in standing d/t dizziness. CGA for steadying assist only really, mostly SBA. Pt SBA to TF to recliner. Asked to use bathroom. Refused urinal. CGA ambulating into BR, voided standing, stood for handwashing. Returned to recliner and left with all needs in reach and B feet elevated. OT educated on energy conservation and pacing of activities with verbal understanding. Needs reinforcement. Also recommended shower chair to conserve energy with showering at home. Pt reports he is considering having his whole bathroom remodeled with a walk in shower and built in seat and his son can do this since he is a builder. Pt is functioning below his baseline, mostly from an activity tolerance standpoint and could benefit from skilled OT while in acute care to address deficits and maximize his safety and independence with self care tasks and functional mobility. May not need any further OT upon DC to home pending progress and ability to wean oxygen. SUBJECTIVE:   Mr. Emily uVong states, \"I get a shower every day and I have not had one in 2 weeks. I really need a shower. \"    SOCIAL HISTORY/LIVING ENVIRONMENT: lives with spouse in 1 level home with 1 step at entrance, no HRs. Pt has 2 T/S combos with GBs and HH nozzle. Son is working on converting master bathroom to walk in shower with built in seat. Independent with ADLs, IADLs, driving, no home oxygen, no falls. No DME owned.   Home Environment: Private residence  # Steps to Enter: 1  One/Two Story Residence: One story  Living Alone: No  Support Systems: Spouse/Significant Other,Other Family Member(s),Child(joy)    OBJECTIVE:     PAIN: VITAL SIGNS: LINES/DRAINS:   Pre Treatment: Pain Screen  Pain Scale 1: Numeric (0 - 10)  Pain Intensity 1: 0  Post Treatment: no complaint of pain Vital Signs  O2 Device: Nasal cannula  O2 Flow Rate (L/min): 6 l/min (PT staff increased d/t dropping with tasks) IV  O2 Device: Nasal cannula     GROSS EVALUATION:  B UEs, R dom Within Functional Limits Abnormal/ Functional Abnormal/ Non-Functional (see comments) Not Tested Comments:   AROM [x] [] [] []    PROM [] [] [] [x]    Strength [x] [] [] []    Balance [] [x] [] []    Posture [x] [] [] []    Sensation [] [x] [] [] B feet with PN   Coordination [x] [] [] []    Tone [] [] [] [x]    Edema [] [] [] [x]    Activity Tolerance [] [x] [] [] Needs rest breaks, especially with coughing spells    [] [] [] []      COGNITION/  PERCEPTION: Intact Impaired   (see comments) Comments:   Orientation X [] x4   Vision [x] [] Wears glasses   Hearing [x] []    Judgment/ Insight [x] []    Attention [x] []    Memory [x] []    Command Following [x] []    Emotional Regulation [x] []     [] []      ACTIVITIES OF DAILY LIVING: I Mod I S SBA CGA Min Mod Max Total NT Comments   BASIC ADLs:              Bathing/ Showering [] [] [] [] [x] [] [] [] [] []    Toileting [] [] [] [] [x] [] [] [] [] []    Dressing [] [] [] [] [x] [] [] [] [] []    Feeding [] [] [x] [] [] [] [] [] [] []    Grooming [] [] [] [] [x] [] [] [] [] []    Personal Device Care [] [] [] [] [] [] [] [] [] []    Functional Mobility [] [] [] [] [x] [] [] [] [] [] No DME   I=Independent, Mod I=Modified Independent, S=Supervision, SBA=Standby Assistance, CGA=Contact Guard Assistance,   Min=Minimal Assistance, Mod=Moderate Assistance, Max=Maximal Assistance, Total=Total Assistance, NT=Not Tested    MOBILITY: I Mod I S SBA CGA Min Mod Max Total  NT x2 Comments:   Supine to sit [] [] [] [x] [] [] [] [] [] [] []    Sit to supine [] [] [] [] [] [] [] [] [] [x] [] Up to recliner for lunch   Sit to stand [] [] [] [] [x] [] [] [] [] [] []    Bed to chair [] [] [] [] [x] [] [] [] [] [] []    I=Independent, Mod I=Modified Independent, S=Supervision, SBA=Standby Assistance, CGA=Contact Guard Assistance,   Min=Minimal Assistance, Mod=Moderate Assistance, Max=Maximal Assistance, Total=Total Assistance, NT=Not [facility] [address] [address]   Daily Activity Inpatient Short Form        How much help from another person does the patient currently need. .. Total A Lot A Little None   1. Putting on and taking off regular lower body clothing? [] 1   [] 2   [x] 3   [] 4   2. Bathing (including washing, rinsing, drying)? [] 1   [] 2   [x] 3   [] 4   3. Toileting, which includes using toilet, bedpan or urinal?   [] 1   [] 2   [x] 3   [] 4   4. Putting on and taking off regular upper body clothing? [] 1   [] 2   [x] 3   [] 4   5. Taking care of personal grooming such as brushing teeth? [] 1   [] 2   [x] 3   [] 4   6. Eating meals? [] 1   [] 2   [] 3   [x] 4   © 2007, Trustees of [facility] [employer], under license to [employer]. All rights reserved     Score:  Initial: 19, completed 01/06/2022 Most Recent: X (Date: -- )   Interpretation of Tool:  Represents activities that are increasingly more difficult (i.e. Bed mobility, Transfers, Gait). PLAN:   FREQUENCY/DURATION: OT Plan of Care: 3 times/week for duration of hospital stay or until stated goals are met, whichever comes first.    PROBLEM LIST:   (Skilled intervention is medically necessary to address:)  1. Decreased ADL/Functional Activities  2. Decreased Activity Tolerance  3. Decreased Balance  4. Decreased Coordination  5. Decreased Transfer Abilities   INTERVENTIONS PLANNED:   (Benefits and precautions of occupational therapy have been discussed with the patient.)  1. Self Care Training  2. Therapeutic Activity  3.  Therapeutic Exercise/HEP  4. Neuromuscular Re-education  5. Education     TREATMENT:     EVALUATION: Low Complexity : (Untimed Charge)    TREATMENT:   ($$ Self Care/Home Management: 38-52 mins    )  Self Care (40 Minutes): Self care including Upper Body Bathing, Lower Body Bathing, Toileting, Upper Body Dressing, Lower Body Dressing, Self Feeding, Grooming, ADL Adaptive Equipment Training, Energy Conservation Training and safety in ADLs to increase independence and decrease level of assistance required.     TREATMENT GRID:  N/A    AFTER TREATMENT POSITION/PRECAUTIONS:  Chair, Needs within reach and RN notified    INTERDISCIPLINARY COLLABORATION:  RN/PCT, PT/PTA, OT/GARZA and RN Case Manager/     TOTAL TREATMENT DURATION:  40 mins  OT Patient Time In/Time Out  Time In: 1130  Time Out: Myles 30, OT    Sindy Thompson MS, OTR/L

## 2022-01-06 NOTE — PROGRESS NOTES
END OF SHIFT NOTE:    Intake/Output  No intake/output data recorded. Voiding: YES  Catheter: NO      Stool:  0 occurrences. Emesis:  0 occurrences. VITAL SIGNS  Patient Vitals for the past 12 hrs:   Temp Pulse Resp BP SpO2   01/06/22 0332  68 12 (!) 148/98 96 %   01/05/22 2342 97.5 °F (36.4 °C) 74 13 136/73    01/05/22 1947 97.5 °F (36.4 °C) 80 14 128/78 95 %       Pain Assessment  Pain 1  Pain Scale 1: Numeric (0 - 10) (01/05/22 2000)  Pain Intensity 1: 0 (01/05/22 2000)  Patient Stated Pain Goal: 0 (01/05/22 2000)    Ambulating  Yes    Additional Information:   - pt resting, no needs voiced at this time    Shift report given to oncoming nurse at the bedside.     Dixon Acevedo RN

## 2022-01-06 NOTE — PROGRESS NOTES
ACUTE PHYSICAL THERAPY GOALS:  (Developed with and agreed upon by patient and/or caregiver. )  LTG:  (1.)Mr. Michelle will move from supine to sit and sit to supine  in bed with INDEPENDENCE within 7 treatment day(s). (2.)Mr. Michelle will transfer from bed to chair and chair to bed with INDEPENDENCE using the least restrictive device within 7 treatment day(s). (3.)Mr. Michelle will ambulate with INDEPENDENCE for 400 feet with the least restrictive device within 7 treatment day(s). (4.)Mr. Michelle will perform standing static and dynamic balance activities x 23 minutes with SUPERVISION to improve safety within 7 treatment day(s). (5.)Mr. Michelle will ambulate and/or perform functional activities for  23 consecutive minutes with stable vital signs and no rests required to improve activity tolerance within 7 treatment days.   ________________________________________________________________________________________________        PHYSICAL THERAPY ASSESSMENT: Initial Assessment and Daily Note PT Treatment Day # 1      Erica Eldridge is a 68 y.o. male   PRIMARY DIAGNOSIS: Acute hypoxemic respiratory failure due to COVID-19 ([facility] [address]  Acute hypoxemic respiratory failure due to COVID-19 ([facility] [place] [address] [U07.1, J96.01]       Reason for Referral:    ICD-10: Treatment Diagnosis: Generalized Muscle Weakness (M62.81)  INPATIENT: Payor: MEDICAL [facility] / Plan: [facility] MEDICAL [address] / Product Type: Commerical /     ASSESSMENT:     REHAB RECOMMENDATIONS:   Recommendation to date pending progress:  Setting:   No further skilled therapy   Equipment:    To Be Determined     PRIOR LEVEL OF FUNCTION:  (Prior to Hospitalization) INITIAL/CURRENT LEVEL OF FUNCTION:  (Most Recently Demonstrated)   Bed Mobility:   Independent  Sit to Stand:   Independent  Transfers:   Independent  Gait/Mobility:   Independent Bed Mobility:   Modified Independent  Sit to Stand:   Supervision  Transfers:   Not tested  Gait/Mobility:   Supervision     ASSESSMENT:  Mr. Taty Molina is a 68year old M who presents C19+ on 5L NC supine in bed with oxygen saturations at 90-93%. Pt does not have a significant PMH. Pt states he lives with his wife in a 1 story home with 1/2 a step to enter and PLOF indep all mobility, gait, ADLs. This date pt performs mobility including bed mobility with Mod I, and STS and gait in room x50ft with supervision. Pt demonstrated slight unsteadiness with gait. Pt desaturated to 83% with after gait in room and required seated rest break to recover to 88-90% and then a supine rest and increase to 6L to increase and maintain above 90-93%. Pt also reported dizziness post mobility and BP seated was 149/88mmHg. RN made aware of changes. Pt presents as functioning below his baseline, with deficits in mobility including transfers, gait, balance, and activity tolerance. Pt will benefit from skilled therapy services to address stated deficits to promote return to highest level of function, independence, and safety. No further PT anticipated at d/c. Will continue to follow. SUBJECTIVE:   Mr. Taty Molina states, \"I have been dizzy. \"    SOCIAL HISTORY/LIVING ENVIRONMENT: lives with wife in 1 story home, 1/2 step to enter. PLOF indep with ADLs, mobility. No DME.   Home Environment: Private residence  # Steps to Enter: 1  One/Two Story Residence: One story  Living Alone: No  Support Systems: Spouse/Significant Other,Other Family Member(s),Child(joy)  OBJECTIVE:     PAIN: VITAL SIGNS: LINES/DRAINS:   Pre Treatment:    Post Treatment: 0 Vital Signs  O2 Device: Nasal cannula  O2 Flow Rate (L/min): 6 l/min IV  O2 Device: Nasal cannula     GROSS EVALUATION:  B LE Within Functional Limits Abnormal/ Functional Abnormal/ Non-Functional (see comments) Not Tested Comments:   AROM [x] [] [] []    PROM [] [] [] []    Strength [x] [] [] []    Balance [] [x] [] [] Unsteady gait in room   Posture [] [] [] []    Sensation [] [] [] [] Coordination [] [] [] []    Tone [] [] [] []    Edema [] [] [] []    Activity Tolerance [] [x] [] [] Does not wear O2 at BL, currently needing 6L    [] [] [] []      COGNITION/  PERCEPTION: Intact Impaired   (see comments) Comments:   Orientation [x] []    Vision [x] []    Hearing [x] []    Command Following [x] []    Safety Awareness [x] []     [] []      MOBILITY: I Mod I S SBA CGA Min Mod Max Total  NT x2 Comments:   Bed Mobility    Rolling [] [x] [] [] [] [] [] [] [] [] []    Supine to Sit [] [x] [] [] [] [] [] [] [] [] []    Scooting [x] [] [] [] [] [] [] [] [] [] []    Sit to Supine [] [x] [] [] [] [] [] [] [] [] []    Transfers    Sit to Stand [] [] [x] [] [] [] [] [] [] [] []    Bed to Chair [] [] [] [] [] [] [] [] [] [x] []    Stand to Sit [] [] [x] [] [] [] [] [] [] [] []    I=Independent, Mod I=Modified Independent, S=Supervision, SBA=Standby Assistance, CGA=Contact Guard Assistance,   Min=Minimal Assistance, Mod=Moderate Assistance, Max=Maximal Assistance, Total=Total Assistance, NT=Not Tested  GAIT: I Mod I S SBA CGA Min Mod Max Total  NT x2 Comments:   Level of Assistance [] [] [x] [] [] [] [] [] [] [] []    Distance 50ft    DME None    Gait Quality Unsteady/WBOS    Weightbearing Status N/A     I=Independent, Mod I=Modified Independent, S=Supervision, SBA=Standby Assistance, CGA=Contact Guard Assistance,   Min=Minimal Assistance, Mod=Moderate Assistance, Max=Maximal Assistance, Total=Total Assistance, NT=Not Tested    [facility] [id number] [facility] Mob[facility] Inpatient Short Form       How much difficulty does the patient currently have. .. Unable A Lot A Little None   1. Turning over in bed (including adjusting bedclothes, sheets and blankets)? [] 1   [] 2   [] 3   [x] 4   2. Sitting down on and standing up from a chair with arms ( e.g., wheelchair, bedside commode, etc.)   [] 1   [] 2   [] 3   [x] 4   3. Moving from lying on back to sitting on the side of the bed?    [] 1   [] 2   [] 3   [x] 4   How much help from another person does the patient currently need. .. Total A Lot A Little None   4. Moving to and from a bed to a chair (including a wheelchair)? [] 1   [] 2   [] 3   [x] 4   5. Need to walk in hospital room? [] 1   [] 2   [] 3   [x] 4   6. Climbing 3-5 steps with a railing? [] 1   [] 2   [] 3   [x] 4   © 2007, Trustees of [address] [place], [place] [id number], under license to [employer]. All rights reserved     Score:  Initial: 24 Most Recent: X (Date: -- )    Interpretation of Tool:  Represents activities that are increasingly more difficult (i.e. Bed mobility, Transfers, Gait). PLAN:   FREQUENCY/DURATION: PT Plan of Care: 2 times/week for duration of hospital stay or until stated goals are met, whichever comes first.    PROBLEM LIST:   (Skilled intervention is medically necessary to address:)  1. Decreased Activity Tolerance  2. Decreased Balance  3. Decreased Gait Ability   INTERVENTIONS PLANNED:   (Benefits and precautions of physical therapy have been discussed with the patient.)  1. Therapeutic Activity  2. Therapeutic Exercise/HEP  3. Neuromuscular Re-education  4. Gait Training  5. Education     TREATMENT:     EVALUATION: Low Complexity : (Untimed Charge)    TREATMENT:   ($$ Therapeutic Activity: 23-37 mins    )  Therapeutic Activity (23 Minutes): Therapeutic activity included Rolling, Supine to Sit, Sit to Supine, Scooting, Transfer Training, Ambulation on level ground, Sitting balance  and Standing balance to improve functional Mobility, Strength and Activity tolerance.     TREATMENT GRID:  N/A    AFTER TREATMENT POSITION/PRECAUTIONS:  Bed, Needs within reach and RN notified    INTERDISCIPLINARY COLLABORATION:  RN/PCT and PT/PTA    TOTAL TREATMENT DURATION:  PT Patient Time In/Time Out  Time In: 1033  Time Out: [facility]

## 2022-01-06 NOTE — PROGRESS NOTES
Problem: Airway Clearance - Ineffective  Goal: Achieve or maintain patent airway  Outcome: Progressing Towards Goal     Problem: Gas Exchange - Impaired  Goal: Absence of hypoxia  Outcome: Progressing Towards Goal  Goal: Promote optimal lung function  Outcome: Progressing Towards Goal     Problem: Breathing Pattern - Ineffective  Goal: Ability to achieve and maintain a regular respiratory rate  Outcome: Progressing Towards Goal     Problem: Body Temperature -  Risk of, Imbalanced  Goal: Ability to maintain a body temperature within defined limits  Outcome: Progressing Towards Goal     Problem: Isolation Precautions - Risk of Spread of Infection  Goal: Prevent transmission of infectious organism to others  Outcome: Progressing Towards Goal     Problem: Nutrition Deficits  Goal: Optimize nutrtional status  Outcome: Progressing Towards Goal     Problem: Risk for Fluid Volume Deficit  Goal: Maintain normal heart rhythm  Outcome: Progressing Towards Goal  Goal: Maintain absence of muscle cramping  Outcome: Progressing Towards Goal  Goal: Maintain normal serum potassium, sodium, calcium, phosphorus, and pH  Outcome: Progressing Towards Goal     Problem: Loneliness or Risk for Loneliness  Goal: Demonstrate positive use of time alone when socialization is not possible  Outcome: Progressing Towards Goal     Problem: Fatigue  Goal: Verbalize increase energy and improved vitality  Outcome: Progressing Towards Goal     Problem: Falls - Risk of  Goal: *Absence of Falls  Description: Document Jaqueline Fall Risk and appropriate interventions in the flowsheet.   Outcome: Progressing Towards Goal  Note: Fall Risk Interventions:            Medication Interventions: Patient to call before getting OOB

## 2022-01-07 PROBLEM — I10 HTN (HYPERTENSION): Status: ACTIVE | Noted: 2022-01-07

## 2022-01-07 LAB
ALBUMIN SERPL-MCNC: 2.3 G/DL (ref 3.2–4.6)
ALBUMIN/GLOB SERPL: 0.5 {RATIO} (ref 1.2–3.5)
ALP SERPL-CCNC: 54 U/L (ref 50–136)
ALT SERPL-CCNC: 59 U/L (ref 12–65)
ANION GAP SERPL CALC-SCNC: 10 MMOL/L (ref 7–16)
AST SERPL-CCNC: 84 U/L (ref 15–37)
BASOPHILS # BLD: 0 K/UL (ref 0–0.2)
BASOPHILS NFR BLD: 0 % (ref 0–2)
BILIRUB SERPL-MCNC: 0.4 MG/DL (ref 0.2–1.1)
BUN SERPL-MCNC: 23 MG/DL (ref 8–23)
CALCIUM SERPL-MCNC: 8.9 MG/DL (ref 8.3–10.4)
CHLORIDE SERPL-SCNC: 107 MMOL/L (ref 98–107)
CO2 SERPL-SCNC: 23 MMOL/L (ref 21–32)
CREAT SERPL-MCNC: 0.92 MG/DL (ref 0.8–1.5)
DIFFERENTIAL METHOD BLD: ABNORMAL
EOSINOPHIL # BLD: 0 K/UL (ref 0–0.8)
EOSINOPHIL NFR BLD: 0 % (ref 0.5–7.8)
ERYTHROCYTE [DISTWIDTH] IN BLOOD BY AUTOMATED COUNT: 13 % (ref 11.9–14.6)
GLOBULIN SER CALC-MCNC: 4.7 G/DL (ref 2.3–3.5)
GLUCOSE SERPL-MCNC: 172 MG/DL (ref 65–100)
HCT VFR BLD AUTO: 46.8 % (ref 41.1–50.3)
HGB BLD-MCNC: 16 G/DL (ref 13.6–17.2)
IMM GRANULOCYTES # BLD AUTO: 0.1 K/UL (ref 0–0.5)
IMM GRANULOCYTES NFR BLD AUTO: 1 % (ref 0–5)
LYMPHOCYTES # BLD: 1.5 K/UL (ref 0.5–4.6)
LYMPHOCYTES NFR BLD: 14 % (ref 13–44)
MCH RBC QN AUTO: 29.6 PG (ref 26.1–32.9)
MCHC RBC AUTO-ENTMCNC: 34.2 G/DL (ref 31.4–35)
MCV RBC AUTO: 86.5 FL (ref 79.6–97.8)
MONOCYTES # BLD: 0.7 K/UL (ref 0.1–1.3)
MONOCYTES NFR BLD: 7 % (ref 4–12)
NEUTS SEG # BLD: 7.9 K/UL (ref 1.7–8.2)
NEUTS SEG NFR BLD: 78 % (ref 43–78)
NRBC # BLD: 0 K/UL (ref 0–0.2)
PLATELET # BLD AUTO: 151 K/UL (ref 150–450)
PMV BLD AUTO: 12 FL (ref 9.4–12.3)
POTASSIUM SERPL-SCNC: 4.6 MMOL/L (ref 3.5–5.1)
PROT SERPL-MCNC: 7 G/DL (ref 6.3–8.2)
RBC # BLD AUTO: 5.41 M/UL (ref 4.23–5.6)
SODIUM SERPL-SCNC: 140 MMOL/L (ref 136–145)
WBC # BLD AUTO: 10.2 K/UL (ref 4.3–11.1)

## 2022-01-07 PROCEDURE — 36415 COLL VENOUS BLD VENIPUNCTURE: CPT

## 2022-01-07 PROCEDURE — 65270000029 HC RM PRIVATE

## 2022-01-07 PROCEDURE — 74011250637 HC RX REV CODE- 250/637: Performed by: FAMILY MEDICINE

## 2022-01-07 PROCEDURE — 85025 COMPLETE CBC W/AUTO DIFF WBC: CPT

## 2022-01-07 PROCEDURE — 74011000250 HC RX REV CODE- 250: Performed by: HOSPITALIST

## 2022-01-07 PROCEDURE — 80053 COMPREHEN METABOLIC PANEL: CPT

## 2022-01-07 PROCEDURE — 74011250637 HC RX REV CODE- 250/637: Performed by: HOSPITALIST

## 2022-01-07 PROCEDURE — 74011000258 HC RX REV CODE- 258: Performed by: HOSPITALIST

## 2022-01-07 PROCEDURE — 74011250636 HC RX REV CODE- 250/636: Performed by: HOSPITALIST

## 2022-01-07 RX ORDER — LISINOPRIL 5 MG/1
10 TABLET ORAL DAILY
Status: DISCONTINUED | OUTPATIENT
Start: 2022-01-07 | End: 2022-01-12 | Stop reason: HOSPADM

## 2022-01-07 RX ORDER — AMLODIPINE BESYLATE 10 MG/1
10 TABLET ORAL DAILY
Status: DISCONTINUED | OUTPATIENT
Start: 2022-01-07 | End: 2022-01-12 | Stop reason: HOSPADM

## 2022-01-07 RX ADMIN — Medication 500 MG: at 09:01

## 2022-01-07 RX ADMIN — SODIUM CHLORIDE, PRESERVATIVE FREE 10 ML: 5 INJECTION INTRAVENOUS at 05:54

## 2022-01-07 RX ADMIN — ACETAMINOPHEN 650 MG: 325 TABLET ORAL at 09:01

## 2022-01-07 RX ADMIN — Medication 500 MG: at 17:24

## 2022-01-07 RX ADMIN — FAMOTIDINE 20 MG: 20 TABLET ORAL at 17:24

## 2022-01-07 RX ADMIN — FAMOTIDINE 20 MG: 20 TABLET ORAL at 09:01

## 2022-01-07 RX ADMIN — GUAIFENESIN SYRUP AND DEXTROMETHORPHAN 5 ML: 100; 10 SYRUP ORAL at 20:03

## 2022-01-07 RX ADMIN — AZITHROMYCIN MONOHYDRATE 250 MG: 250 TABLET ORAL at 09:01

## 2022-01-07 RX ADMIN — SODIUM CHLORIDE, PRESERVATIVE FREE 10 ML: 5 INJECTION INTRAVENOUS at 13:27

## 2022-01-07 RX ADMIN — BARICITINIB 4 MG: 2 TABLET, FILM COATED ORAL at 09:00

## 2022-01-07 RX ADMIN — SODIUM CHLORIDE, PRESERVATIVE FREE 10 ML: 5 INJECTION INTRAVENOUS at 22:49

## 2022-01-07 RX ADMIN — LISINOPRIL 10 MG: 5 TABLET ORAL at 13:26

## 2022-01-07 RX ADMIN — ACETAMINOPHEN 650 MG: 325 TABLET ORAL at 22:48

## 2022-01-07 RX ADMIN — AMLODIPINE BESYLATE 10 MG: 10 TABLET ORAL at 13:26

## 2022-01-07 RX ADMIN — GUAIFENESIN SYRUP AND DEXTROMETHORPHAN 5 ML: 100; 10 SYRUP ORAL at 09:00

## 2022-01-07 RX ADMIN — HYDRALAZINE HYDROCHLORIDE 10 MG: 20 INJECTION INTRAMUSCULAR; INTRAVENOUS at 05:51

## 2022-01-07 RX ADMIN — CEFTRIAXONE 2 G: 2 INJECTION, POWDER, FOR SOLUTION INTRAMUSCULAR; INTRAVENOUS at 13:27

## 2022-01-07 RX ADMIN — DEXAMETHASONE 6 MG: 4 TABLET ORAL at 09:01

## 2022-01-07 RX ADMIN — ENOXAPARIN SODIUM 40 MG: 100 INJECTION SUBCUTANEOUS at 13:26

## 2022-01-07 NOTE — ACP (ADVANCE CARE PLANNING)
Advance Care Planning     General Advance Care Planning (ACP) Conversation      Date of Conversation: 1/5/2022  Conducted with: Patient with Decision Making Capacity    Healthcare Decision Maker:     Primary Decision Maker: Fallon Vilas - Spouse - [phone]  Click here to complete [address] including selection of the Healthcare Decision Maker Relationship (ie \"Primary\")          Content/Action Overview:   DECLINED ACP conversation - will revisit periodically   Reviewed DNR/DNI and patient elects Full Code (Attempt Resuscitation)         Length of Voluntary ACP Conversation in minutes:  <16 minutes (Non-Billable)    Kylie Bhakta RN

## 2022-01-07 NOTE — PROGRESS NOTES
Tylenol 650 mg po given for neck pain 5/10 (pt does not want anything stronger than Tylenol for pain) & Robitussin 5 mL po given for c/o coughing.

## 2022-01-07 NOTE — PROGRESS NOTES
Pt states his pain is 1/10 on his neck. States the Tylenol helped & that the Robitussin did help ease his cough.

## 2022-01-07 NOTE — PROGRESS NOTES
Occupational Therapy Note: pt politely declined all therapy attempts this morning, noted to be struggling with breathing while talking on phone with family, face red and supine in bed with HOB elevated. Will check back later as time and schedule allows. Thank you.  Jose Angel Menchaca MS, OTR/L

## 2022-01-07 NOTE — PROGRESS NOTES
Continues to void without difficulty. Reports having small watery, yellow stools, prior to admission for a couple of days, and several while here. Hydralazine 10 mg given IVP slowly for /89.

## 2022-01-07 NOTE — PROGRESS NOTES
Hospitalist Progress Note   Admit Date:  2022 10:11 AM   Name:  Heather Mendoza   Age:  68 y.o. Sex:  male  :  1945   MRN:  [id number]   Room:  [facility]    Presenting Complaint: Positive For Covid-19    Reason(s) for Admission: Acute hypoxemic respiratory failure due to COVID-19 ([facility] [U07.1, J96.01]     Hospital Course & Interval History: This is a 40-year-old male with no significant prior past medical history presented to ER from emergency MD via EMS as he was found hypoxic with oxygen saturation of low 80s on room air. He was diagnosed with COVID-19 infection on . Patient was admitted for acute hypoxemic respiratory failure secondary to COVID-19 pneumonia. No fever no chills. Subjective (22): Complain of his chronic neck pain and dizziness, says has similar episodes at home which last for few hours. Says breathing better, was able to get up and go to restroom. Still requiring around 6 L/min    Assessment & Plan:   Acute hypoxemic respiratory failure due to COVID-19 [facility]) (2022)    Chest x-ray on admission shows multifocal airspace opacities. Patient is currently needing 5-6 L oxygen nasal cannula to maintain saturations greater than 88%. Not vaccinated against COVID-19. [facility] tested positive for Covid on 21. Continue dexamethasone.  Procalcitonin is 0.15.    Continue Ceftriaxone, azithromycin, Baricitinib. D-dimer is 1.88  CRP is 12. Home oxygen screen prior to discharge. Ordered gentle fluids due to diarrhea. 2022  Presently requiring 6 L/min    #Chronic neck pain with dizziness  Says has similar episode at home and last for few hours. Says was told that no other surgery for his neck. #Elevated blood pressure  We will add amlodipine and lisinopril  Continue as needed hydralazine       Hypokalemia (2022)  Resolved     Mild hyponatremia  Resolved.     Thrombocytopenia mild  Platelet count of 640 on admission.  This am, 126.   No signs of active bleeding. Likely from infection.  Monitor for now.     Mild transaminitis:  No abdominal pain. Likely from [facility].       Dispo/Discharge Planning:    Anticipate inpatient hospital stay for 48 hours. Home oxygen screen prior to discharge when oxygen requirements improved.     Diet:  ADULT DIET Regular; Low Fat/Low Chol/High Fiber/2 gm Na  DVT PPx: Lovenox  Code status: Full Code      Hospital Problems as of 1/7/2022 Never Reviewed          Codes Class Noted - Resolved POA    HTN (hypertension) ICD-10-CM: I10  ICD-9-CM: 401.9  1/7/2022 - Present Unknown        * (Principal) Acute hypoxemic respiratory failure due to COVID-19 [facility]) ICD-10-CM: U07.1, J96.01  ICD-9-CM: 518.81, 079.89, 799.02  1/5/2022 - Present Unknown        Hypokalemia ICD-10-CM: E87.6  ICD-9-CM: 276.8  1/5/2022 - Present Unknown        Hyponatremia ICD-10-CM: E87.1  ICD-9-CM: 276.1  1/5/2022 - Present Unknown        Thrombocytopenia ([facility] [address] ICD-10-CM: D69.6  ICD-9-CM: 287.5  1/5/2022 - Present Unknown              Objective:     Patient Vitals for the past 24 hrs:   Temp Pulse Resp BP SpO2   01/07/22 1218 97.4 °F (36.3 °C) 93  (!) 172/97 93 %   01/07/22 0800 98.4 °F (36.9 °C) 98 18 (!) 146/80 91 %   01/07/22 0507 97.6 °F (36.4 °C) (!) 113 20 (!) 172/89 90 %   01/06/22 2352 98.3 °F (36.8 °C) 69 15 (!) 167/90    01/06/22 2000 97.9 °F (36.6 °C) 88 13 (!) 166/88 91 %   01/06/22 1548 97.5 °F (36.4 °C) 78 16 126/73 94 %     Oxygen Therapy  O2 Sat (%): 93 % (01/07/22 1218)  Pulse via Oximetry: 87 beats per minute (01/05/22 1055)  O2 Device: Nasal cannula (01/06/22 2000)  O2 Flow Rate (L/min): 6 l/min (01/06/22 1300)    Estimated body mass index is 38.74 kg/m² as calculated from the following:    Height as of this encounter: 5' 6\" (1.676 m). Weight as of this encounter: 108.9 kg (240 lb).     Intake/Output Summary (Last 24 hours) at 1/7/2022 1311  Last data filed at 1/7/2022 0530  Gross per 24 hour   Intake    Output 250 ml   Net -250 ml Physical Exam:     Blood pressure (!) 172/97, pulse 93, temperature 97.4 °F (36.3 °C), resp. rate 18, height 5' 6\" (1.676 m), weight 108.9 kg (240 lb), SpO2 93 %. General:    Well nourished. Mild respiratory distress. complaining of mild dizziness from his neck pain which he says is chronic. Presently on 6 L/min  Head:  Normocephalic, atraumatic  Eyes:  Sclerae appear normal.  Pupils equally round. ENT:  Nares appear normal, no drainage. Moist oral mucosa  Neck:  No restricted ROM. Trachea midline   CV:   RRR. No m/r/g. No jugular venous distension. Lungs:   Bilateral coarse breath sounds  Abdomen: Bowel sounds present. Soft, nontender, nondistended. Extremities: No cyanosis or clubbing. No edema  Skin:     No rashes and normal coloration. Warm and dry. Neuro:  CN II-XII grossly intact. Sensation intact. A&Ox3  Psych:  Normal mood and affect. I have reviewed ordered lab tests and independently visualized imaging below:    Recent Labs:  Recent Results (from the past 48 hour(s))   METABOLIC PANEL, COMPREHENSIVE    Collection Time: 01/06/22  6:38 AM   Result Value Ref Range    Sodium 139 136 - 145 mmol/L    Potassium 3.8 3.5 - 5.1 mmol/L    Chloride 106 98 - 107 mmol/L    CO2 24 21 - 32 mmol/L    Anion gap 9 7 - 16 mmol/L    Glucose 131 (H) 65 - 100 mg/dL    BUN 20 8 - 23 MG/DL    Creatinine 1.06 0.8 - 1.5 MG/DL    GFR est AA >60 >60 ml/min/1.73m2    GFR est non-AA >60 >60 ml/min/1.73m2    Calcium 8.9 8.3 - 10.4 MG/DL    Bilirubin, total 0.5 0.2 - 1.1 MG/DL    ALT (SGPT) 57 12 - 65 U/L    AST (SGOT) 85 (H) 15 - 37 U/L    Alk.  phosphatase 54 50 - 136 U/L    Protein, total 7.9 6.3 - 8.2 g/dL    Albumin 2.6 (L) 3.2 - 4.6 g/dL    Globulin 5.3 (H) 2.3 - 3.5 g/dL    A-G Ratio 0.5 (L) 1.2 - 3.5     C REACTIVE PROTEIN, QT    Collection Time: 01/06/22  6:38 AM   Result Value Ref Range    C-Reactive protein 12.0 (H) 0.0 - 0.9 mg/dL   D DIMER    Collection Time: 01/06/22  6:38 AM   Result Value Ref Range    D DIMER 1.88 (H) <0.56 ug/ml(FEU)   CBC WITH AUTOMATED DIFF    Collection Time: 01/06/22  8:48 AM   Result Value Ref Range    WBC 4.5 4.3 - 11.1 K/uL    RBC 5.66 (H) 4.23 - 5.6 M/uL    HGB 16.4 13.6 - 17.2 g/dL    HCT 48.4 41.1 - 50.3 %    MCV 85.5 79.6 - 97.8 FL    MCH 29.0 26.1 - 32.9 PG    MCHC 33.9 31.4 - 35.0 g/dL    RDW 12.6 11.9 - 14.6 %    PLATELET 339 (L) 952 - 450 K/uL    MPV 11.4 9.4 - 12.3 FL    ABSOLUTE NRBC 0.00 0.0 - 0.2 K/uL    NEUTROPHILS 62 43 - 78 %    LYMPHOCYTES 27 13 - 44 %    MONOCYTES 11 4.0 - 12.0 %    EOSINOPHILS 0 (L) 0.5 - 7.8 %    BASOPHILS 0 0.0 - 2.0 %    IMMATURE GRANULOCYTES 0 0.0 - 5.0 %    ABS. NEUTROPHILS 2.8 1.7 - 8.2 K/UL    ABS. LYMPHOCYTES 1.2 0.5 - 4.6 K/UL    ABS. MONOCYTES 0.5 0.1 - 1.3 K/UL    ABS. EOSINOPHILS 0.0 0.0 - 0.8 K/UL    ABS. BASOPHILS 0.0 0.0 - 0.2 K/UL    ABS. IMM. GRANS. 0.0 0.0 - 0.5 K/UL    RBC COMMENTS NORMOCYTIC/NORMOCHROMIC      WBC COMMENTS OCCASIONAL      PLATELET COMMENTS SLIGHT      DF AUTOMATED         All Micro Results     Procedure Component Value Units Date/Time    COVID-19 RAPID TEST [[id number]]  (Abnormal) Collected: 01/05/22 1128    Order Status: Completed Specimen: Nasopharyngeal Updated: 01/05/22 1204     Specimen source NASAL        Comment: RAPID ONLY        COVID-19 rapid test Detected        Comment:      The specimen is POSITIVE for SARS-CoV-2, the novel coronavirus associated with COVID-19. This test has been authorized by the FDA under an Emergency Use Authorization (EUA) for use by authorized laboratories. Fact sheet for Healthcare Providers: ConventionUpdate.co.nz  Fact sheet for Patients: ConventionUpdate.co.nz       Methodology: Isothermal Nucleic Acid Amplification               Other Studies:  No results found.     Current Meds:  Current Facility-Administered Medications   Medication Dose Route Frequency    amLODIPine (NORVASC) tablet 10 mg  10 mg Oral DAILY    lisinopriL (PRINIVIL, ZESTRIL) tablet 10 mg  10 mg Oral DAILY    Saccharomyces boulardii (FLORASTOR) capsule 500 mg  500 mg Oral BID    hydrALAZINE (APRESOLINE) 20 mg/mL injection 10 mg  10 mg IntraVENous Q6H PRN    sodium chloride (NS) flush 5-40 mL  5-40 mL IntraVENous Q8H    sodium chloride (NS) flush 5-40 mL  5-40 mL IntraVENous PRN    acetaminophen (TYLENOL) tablet 650 mg  650 mg Oral Q6H PRN    Or    acetaminophen (TYLENOL) suppository 650 mg  650 mg Rectal Q6H PRN    polyethylene glycol (MIRALAX) packet 17 g  17 g Oral DAILY PRN    ondansetron (ZOFRAN ODT) tablet 4 mg  4 mg Oral Q8H PRN    Or    ondansetron (ZOFRAN) injection 4 mg  4 mg IntraVENous Q6H PRN    famotidine (PEPCID) tablet 20 mg  20 mg Oral BID    guaiFENesin-dextromethorphan (ROBITUSSIN DM) 100-10 mg/5 mL syrup 5 mL  5 mL Oral Q4H PRN    dexAMETHasone (DECADRON) tablet 6 mg  6 mg Oral DAILY    cefTRIAXone (ROCEPHIN) 2 g in 0.9% sodium chloride (MBP/ADV) 50 mL MBP  2 g IntraVENous Q24H    azithromycin (ZITHROMAX) tablet 250 mg  250 mg Oral DAILY    baricitinib (OLUMIANT) tablet 4 mg  4 mg Oral DAILY    enoxaparin (LOVENOX) injection 40 mg  40 mg SubCUTAneous Q24H    influenza vaccine 2021-22 (6 mos+)(PF) (FLUARIX/FLULAVAL/FLUZONE QUAD) injection 0.5 mL  1 Each IntraMUSCular PRIOR TO DISCHARGE       Signed:  Mya Boyd MD

## 2022-01-07 NOTE — PROGRESS NOTES
Emergency Contact  Ben Wood Held   [phone]  Patient lives with his wife Lance Alvarez [phone]  in a 1 story home with 1/2 a step to enter and PLOF indep all mobility, gait, ADLs. No further skilled needs at discharge  Will continue to follow for discharge planning needs  Please consult  if any new issues arise  Care Management Interventions  PCP Verified by CM: Yes  Mode of Transport at Discharge:  Other (see comment)  Transition of Care Consult (CM Consult): Discharge Planning  Discharge Durable Medical Equipment: No  Physical Therapy Consult: Yes  Occupational Therapy Consult: Yes  Speech Therapy Consult: No  Support Systems: Spouse/Significant Other,Other Family Member(s),Child(joy)  Confirm Follow Up Transport: Family  The Plan for Transition of Care is Related to the Following Treatment Goals : return to baseline  The Patient and/or Patient Representative was Provided with a Choice of Provider and Agrees with the Discharge Plan?: Yes  Name of the Patient Representative Who was Provided with a Choice of Provider and Agrees with the Discharge Plan: patient  Freedom of Choice List was Provided with Basic Dialogue that Supports the Patient's Individualized Plan of Care/Goals, Treatment Preferences and Shares the Quality Data Associated with the Providers?: Yes   Resource Information Provided?: No  Discharge Location  Discharge Placement: Home with family assistance

## 2022-01-07 NOTE — PROGRESS NOTES
Resting quietly, awake, resp even, unlab at rest with O2 intact at 6L N/C. Skin warm, dry. AP reg, lungs sounds clear. Reports steady while standing and ambulating. Call light in reach. No c/o. No distress.

## 2022-01-07 NOTE — PROGRESS NOTES
Resting quietly, awake, resp labored on exertion, improved at rest. Call light in reach. Report given to Carlee Poon RN.

## 2022-01-08 PROCEDURE — 74011000250 HC RX REV CODE- 250: Performed by: HOSPITALIST

## 2022-01-08 PROCEDURE — 65270000029 HC RM PRIVATE

## 2022-01-08 PROCEDURE — 74011250637 HC RX REV CODE- 250/637: Performed by: FAMILY MEDICINE

## 2022-01-08 PROCEDURE — 74011250636 HC RX REV CODE- 250/636: Performed by: HOSPITALIST

## 2022-01-08 PROCEDURE — 74011250637 HC RX REV CODE- 250/637: Performed by: HOSPITALIST

## 2022-01-08 PROCEDURE — 74011000258 HC RX REV CODE- 258: Performed by: HOSPITALIST

## 2022-01-08 RX ADMIN — SODIUM CHLORIDE, PRESERVATIVE FREE 10 ML: 5 INJECTION INTRAVENOUS at 13:31

## 2022-01-08 RX ADMIN — GUAIFENESIN SYRUP AND DEXTROMETHORPHAN 5 ML: 100; 10 SYRUP ORAL at 05:07

## 2022-01-08 RX ADMIN — CEFTRIAXONE 2 G: 2 INJECTION, POWDER, FOR SOLUTION INTRAMUSCULAR; INTRAVENOUS at 13:31

## 2022-01-08 RX ADMIN — ENOXAPARIN SODIUM 40 MG: 100 INJECTION SUBCUTANEOUS at 13:31

## 2022-01-08 RX ADMIN — GUAIFENESIN SYRUP AND DEXTROMETHORPHAN 5 ML: 100; 10 SYRUP ORAL at 21:06

## 2022-01-08 RX ADMIN — BARICITINIB 4 MG: 2 TABLET, FILM COATED ORAL at 08:50

## 2022-01-08 RX ADMIN — LISINOPRIL 10 MG: 5 TABLET ORAL at 08:50

## 2022-01-08 RX ADMIN — SODIUM CHLORIDE, PRESERVATIVE FREE 10 ML: 5 INJECTION INTRAVENOUS at 22:00

## 2022-01-08 RX ADMIN — AZITHROMYCIN MONOHYDRATE 250 MG: 250 TABLET ORAL at 08:50

## 2022-01-08 RX ADMIN — FAMOTIDINE 20 MG: 20 TABLET ORAL at 08:50

## 2022-01-08 RX ADMIN — SODIUM CHLORIDE, PRESERVATIVE FREE 10 ML: 5 INJECTION INTRAVENOUS at 05:08

## 2022-01-08 RX ADMIN — DEXAMETHASONE 6 MG: 4 TABLET ORAL at 08:49

## 2022-01-08 RX ADMIN — FAMOTIDINE 20 MG: 20 TABLET ORAL at 17:33

## 2022-01-08 RX ADMIN — Medication 500 MG: at 08:49

## 2022-01-08 RX ADMIN — ACETAMINOPHEN 650 MG: 325 TABLET ORAL at 09:08

## 2022-01-08 RX ADMIN — Medication 500 MG: at 17:32

## 2022-01-08 RX ADMIN — GUAIFENESIN SYRUP AND DEXTROMETHORPHAN 5 ML: 100; 10 SYRUP ORAL at 09:08

## 2022-01-08 RX ADMIN — AMLODIPINE BESYLATE 10 MG: 10 TABLET ORAL at 08:49

## 2022-01-08 NOTE — PROGRESS NOTES
Tylenol 650 mg po given for neck pain 5/10 (pt only wants Tylenol for his pain. .nothing stronger) & Robitussin 5 mL po for his cough.

## 2022-01-08 NOTE — PROGRESS NOTES
Received pt from [facility]) in stable condition. Pt in bed resting quietly. Resp even & unlabored on 6L NC; no acute signs of distress noted. Bed low & locked; call light in reach; no needs voiced.

## 2022-01-08 NOTE — PROGRESS NOTES
Hospitalist Progress Note   Admit Date:  2022 10:11 AM   Name:  Genie Tello   Age:  68 y.o. Sex:  male  :  1945   MRN:  [id number]   Room:  [facility]    Presenting Complaint: Positive For Covid-19    Reason(s) for Admission: Acute hypoxemic respiratory failure due to COVID-19 ([facility] [U07.1, J96.01]     Hospital Course & Interval History: This is a 55-year-old male with no significant prior past medical history presented to ER from emergency MD via EMS as he was found hypoxic with oxygen saturation of low 80s on room air. He was diagnosed with COVID-19 infection on . Patient was admitted for acute hypoxemic respiratory failure secondary to COVID-19 pneumonia. No fever no chills. Subjective (22):  2022  Complain of his chronic neck pain and dizziness, says has similar episodes at home which last for few hours. Says breathing better, was able to get up and go to restroom. Still requiring around 6 L/min    2022  Says chronic  dizziness better  Still requiring 6 lit/min  Spoke to son and update pts condition    Assessment & Plan:   Acute hypoxemic respiratory failure due to COVID-19 [facility]) (2022)    Chest x-ray on admission shows multifocal airspace opacities. Patient is currently needing 5-6 L oxygen nasal cannula to maintain saturations greater than 88%. Not vaccinated against COVID-19. Carlos Rosales tested positive for Covid on 21. Continue dexamethasone.  Procalcitonin is 0.15.    Continue Ceftriaxone, azithromycin, Baricitinib. D-dimer is 1.88  CRP is 12. Home oxygen screen prior to discharge. Ordered gentle fluids due to diarrhea. 2022  Presently requiring 6 L/min    #Chronic neck pain with dizziness  Says has similar episode at home and last for few hours. Says was told that no other surgery for his neck.     #Elevated blood pressure  We will add amlodipine and lisinopril  Continue as needed hydralazine  2022  stable       Hypokalemia (1/5/2022)  Resolved     Mild hyponatremia  Resolved.     Thrombocytopenia mild  Platelet count of 386 on admission. This am, 126.   No signs of active bleeding. Likely from infection.  Monitor for now. 1/8/2022  resolved     Mild transaminitis:  No abdominal pain. Likely from [employer] 23.       Dispo/Discharge Planning:    Depending on the oxygen requirements     Diet:  ADULT DIET Regular; Low Fat/Low Chol/High Fiber/2 gm Na  DVT PPx: Lovenox  Code status: Full Code      Hospital Problems as of 1/8/2022 Never Reviewed          Codes Class Noted - Resolved POA    HTN (hypertension) ICD-10-CM: I10  ICD-9-CM: 401.9  1/7/2022 - Present Unknown        * (Principal) Acute hypoxemic respiratory failure due to COVID-19 [facility]) ICD-10-CM: U07.1, J96.01  ICD-9-CM: 518.81, 079.89, 799.02  1/5/2022 - Present Unknown        Hypokalemia ICD-10-CM: E87.6  ICD-9-CM: 276.8  1/5/2022 - Present Unknown        Hyponatremia ICD-10-CM: E87.1  ICD-9-CM: 276.1  1/5/2022 - Present Unknown        Thrombocytopenia ([address] ICD-10-CM: D69.6  ICD-9-CM: 287.5  1/5/2022 - Present Unknown              Objective:     Patient Vitals for the past 24 hrs:   Temp Pulse Resp BP SpO2   01/08/22 1136 97.9 °F (36.6 °C) 73 20 119/72 92 %   01/08/22 0724 97.5 °F (36.4 °C) 70 20 (!) 140/90 91 %   01/08/22 0402 97.5 °F (36.4 °C) 74 28 123/71 93 %   01/08/22 0109 97.3 °F (36.3 °C) 76 16 139/83 96 %   01/07/22 1953 97.3 °F (36.3 °C) 88 28 (!) 140/79 91 %   01/07/22 1600 97.2 °F (36.2 °C) 87 22 (!) 152/85 94 %     Oxygen Therapy  O2 Sat (%): 92 % (01/08/22 1136)  Pulse via Oximetry: 87 beats per minute (01/05/22 1055)  O2 Device: Nasal cannula (01/08/22 1136)  O2 Flow Rate (L/min): 6 l/min (01/08/22 0402)    Estimated body mass index is 38.74 kg/m² as calculated from the following:    Height as of this encounter: 5' 6\" (1.676 m). Weight as of this encounter: 108.9 kg (240 lb).     Intake/Output Summary (Last 24 hours) at 1/8/2022 4352  Last data filed at 1/8/2022 1136  Gross per 24 hour   Intake 175 ml   Output 550 ml   Net -375 ml         Physical Exam:     Blood pressure 119/72, pulse 73, temperature 97.9 °F (36.6 °C), resp. rate 20, height 5' 6\" (1.676 m), weight 108.9 kg (240 lb), SpO2 92 %. General:    Well nourished. Mild respiratory distress. Says chronic intermittent dizziness better. Presently on 6 L/min  Head:  Normocephalic, atraumatic  Eyes:  Sclerae appear normal.  Pupils equally round. ENT:  Nares appear normal, no drainage. Moist oral mucosa  Neck:  No restricted ROM. Trachea midline   CV:   RRR. No m/r/g. No jugular venous distension. Lungs:   Bilateral coarse breath sounds  Abdomen: Bowel sounds present. Soft, nontender, nondistended. Extremities: No cyanosis or clubbing. No edema  Skin:     No rashes and normal coloration. Warm and dry. Neuro:  CN II-XII grossly intact. Sensation intact. A&Ox3  Psych:  Normal mood and affect. I have reviewed ordered lab tests and independently visualized imaging below:    Recent Labs:  Recent Results (from the past 48 hour(s))   CBC WITH AUTOMATED DIFF    Collection Time: 01/07/22  4:30 PM   Result Value Ref Range    WBC 10.2 4.3 - 11.1 K/uL    RBC 5.41 4.23 - 5.6 M/uL    HGB 16.0 13.6 - 17.2 g/dL    HCT 46.8 41.1 - 50.3 %    MCV 86.5 79.6 - 97.8 FL    MCH 29.6 26.1 - 32.9 PG    MCHC 34.2 31.4 - 35.0 g/dL    RDW 13.0 11.9 - 14.6 %    PLATELET 059 892 - 563 K/uL    MPV 12.0 9.4 - 12.3 FL    ABSOLUTE NRBC 0.00 0.0 - 0.2 K/uL    DF AUTOMATED      NEUTROPHILS 78 43 - 78 %    LYMPHOCYTES 14 13 - 44 %    MONOCYTES 7 4.0 - 12.0 %    EOSINOPHILS 0 (L) 0.5 - 7.8 %    BASOPHILS 0 0.0 - 2.0 %    IMMATURE GRANULOCYTES 1 0.0 - 5.0 %    ABS. NEUTROPHILS 7.9 1.7 - 8.2 K/UL    ABS. LYMPHOCYTES 1.5 0.5 - 4.6 K/UL    ABS. MONOCYTES 0.7 0.1 - 1.3 K/UL    ABS. EOSINOPHILS 0.0 0.0 - 0.8 K/UL    ABS. BASOPHILS 0.0 0.0 - 0.2 K/UL    ABS. IMM.  GRANS. 0.1 0.0 - 0.5 K/UL   METABOLIC PANEL, COMPREHENSIVE    Collection Time: 01/07/22  4:30 PM   Result Value Ref Range    Sodium 140 136 - 145 mmol/L    Potassium 4.6 3.5 - 5.1 mmol/L    Chloride 107 98 - 107 mmol/L    CO2 23 21 - 32 mmol/L    Anion gap 10 7 - 16 mmol/L    Glucose 172 (H) 65 - 100 mg/dL    BUN 23 8 - 23 MG/DL    Creatinine 0.92 0.8 - 1.5 MG/DL    GFR est AA >60 >60 ml/min/1.73m2    GFR est non-AA >60 >60 ml/min/1.73m2    Calcium 8.9 8.3 - 10.4 MG/DL    Bilirubin, total 0.4 0.2 - 1.1 MG/DL    ALT (SGPT) 59 12 - 65 U/L    AST (SGOT) 84 (H) 15 - 37 U/L    Alk. phosphatase 54 50 - 136 U/L    Protein, total 7.0 6.3 - 8.2 g/dL    Albumin 2.3 (L) 3.2 - 4.6 g/dL    Globulin 4.7 (H) 2.3 - 3.5 g/dL    A-G Ratio 0.5 (L) 1.2 - 3.5         All Micro Results     Procedure Component Value Units Date/Time    COVID-19 RAPID TEST [[id number]]  (Abnormal) Collected: 01/05/22 1128    Order Status: Completed Specimen: Nasopharyngeal Updated: 01/05/22 1204     Specimen source NASAL        Comment: RAPID ONLY        COVID-19 rapid test Detected        Comment:      The specimen is POSITIVE for SARS-CoV-2, the novel coronavirus associated with COVID-19. This test has been authorized by the FDA under an Emergency Use Authorization (EUA) for use by authorized laboratories. Fact sheet for Healthcare Providers: ConventionUpdate.co.nz  Fact sheet for Patients: ConventionUpdate.co.nz       Methodology: Isothermal Nucleic Acid Amplification               Other Studies:  No results found.     Current Meds:  Current Facility-Administered Medications   Medication Dose Route Frequency    amLODIPine (NORVASC) tablet 10 mg  10 mg Oral DAILY    lisinopriL (PRINIVIL, ZESTRIL) tablet 10 mg  10 mg Oral DAILY    Saccharomyces boulardii (FLORASTOR) capsule 500 mg  500 mg Oral BID    hydrALAZINE (APRESOLINE) 20 mg/mL injection 10 mg  10 mg IntraVENous Q6H PRN    sodium chloride (NS) flush 5-40 mL  5-40 mL IntraVENous Q8H    sodium chloride (NS) flush 5-40 mL 5-40 mL IntraVENous PRN    acetaminophen (TYLENOL) tablet 650 mg  650 mg Oral Q6H PRN    Or    acetaminophen (TYLENOL) suppository 650 mg  650 mg Rectal Q6H PRN    polyethylene glycol (MIRALAX) packet 17 g  17 g Oral DAILY PRN    ondansetron (ZOFRAN ODT) tablet 4 mg  4 mg Oral Q8H PRN    Or    ondansetron (ZOFRAN) injection 4 mg  4 mg IntraVENous Q6H PRN    famotidine (PEPCID) tablet 20 mg  20 mg Oral BID    guaiFENesin-dextromethorphan (ROBITUSSIN DM) 100-10 mg/5 mL syrup 5 mL  5 mL Oral Q4H PRN    dexAMETHasone (DECADRON) tablet 6 mg  6 mg Oral DAILY    cefTRIAXone (ROCEPHIN) 2 g in 0.9% sodium chloride (MBP/ADV) 50 mL MBP  2 g IntraVENous Q24H    azithromycin (ZITHROMAX) tablet 250 mg  250 mg Oral DAILY    baricitinib (OLUMIANT) tablet 4 mg  4 mg Oral DAILY    enoxaparin (LOVENOX) injection 40 mg  40 mg SubCUTAneous Q24H    influenza vaccine 2021-22 (6 mos+)(PF) (FLUARIX/FLULAVAL/FLUZONE QUAD) injection 0.5 mL  1 Each IntraMUSCular PRIOR TO DISCHARGE       Signed:  Noé Alfaro MD

## 2022-01-08 NOTE — PROGRESS NOTES
Problem: Airway Clearance - Ineffective  Goal: Achieve or maintain patent airway  Outcome: Progressing Towards Goal     Problem: Gas Exchange - Impaired  Goal: Absence of hypoxia  Outcome: Progressing Towards Goal  Goal: Promote optimal lung function  Outcome: Progressing Towards Goal     Problem: Breathing Pattern - Ineffective  Goal: Ability to achieve and maintain a regular respiratory rate  Outcome: Progressing Towards Goal     Problem: Body Temperature -  Risk of, Imbalanced  Goal: Ability to maintain a body temperature within defined limits  Outcome: Progressing Towards Goal  Goal: Will regain or maintain usual level of consciousness  Outcome: Progressing Towards Goal  Goal: Complications related to the disease process, condition or treatment will be avoided or minimized  Outcome: Progressing Towards Goal     Problem: Isolation Precautions - Risk of Spread of Infection  Goal: Prevent transmission of infectious organism to others  Outcome: Progressing Towards Goal     Problem: Nutrition Deficits  Goal: Optimize nutrtional status  Outcome: Progressing Towards Goal     Problem: Risk for Fluid Volume Deficit  Goal: Maintain normal heart rhythm  Outcome: Progressing Towards Goal  Goal: Maintain absence of muscle cramping  Outcome: Progressing Towards Goal     Problem: Loneliness or Risk for Loneliness  Goal: Demonstrate positive use of time alone when socialization is not possible  Outcome: Progressing Towards Goal     Problem: Fatigue  Goal: Verbalize increase energy and improved vitality  Outcome: Progressing Towards Goal     Problem: Falls - Risk of  Goal: *Absence of Falls  Description: Document Jaqueline Fall Risk and appropriate interventions in the flowsheet.   Outcome: Progressing Towards Goal  Note: Fall Risk Interventions:  Mobility Interventions: Patient to call before getting OOB         Medication Interventions: Patient to call before getting OOB,Teach patient to arise slowly    Elimination Interventions: Call light in reach,Patient to call for help with toileting needs,Toileting schedule/hourly rounds,Urinal in reach

## 2022-01-09 ENCOUNTER — APPOINTMENT (OUTPATIENT)
Dept: GENERAL RADIOLOGY | Age: 77
DRG: 177 | End: 2022-01-09
Attending: FAMILY MEDICINE
Payer: MEDICARE

## 2022-01-09 LAB
ALBUMIN SERPL-MCNC: 2.3 G/DL (ref 3.2–4.6)
ALBUMIN/GLOB SERPL: 0.5 {RATIO} (ref 1.2–3.5)
ALP SERPL-CCNC: 54 U/L (ref 50–136)
ALT SERPL-CCNC: 67 U/L (ref 12–65)
ANION GAP SERPL CALC-SCNC: 6 MMOL/L (ref 7–16)
AST SERPL-CCNC: 51 U/L (ref 15–37)
BILIRUB SERPL-MCNC: 0.5 MG/DL (ref 0.2–1.1)
BUN SERPL-MCNC: 24 MG/DL (ref 8–23)
CALCIUM SERPL-MCNC: 8.7 MG/DL (ref 8.3–10.4)
CHLORIDE SERPL-SCNC: 107 MMOL/L (ref 98–107)
CO2 SERPL-SCNC: 25 MMOL/L (ref 21–32)
CREAT SERPL-MCNC: 0.74 MG/DL (ref 0.8–1.5)
GLOBULIN SER CALC-MCNC: 4.5 G/DL (ref 2.3–3.5)
GLUCOSE SERPL-MCNC: 150 MG/DL (ref 65–100)
POTASSIUM SERPL-SCNC: 4.5 MMOL/L (ref 3.5–5.1)
PROT SERPL-MCNC: 6.8 G/DL (ref 6.3–8.2)
SODIUM SERPL-SCNC: 138 MMOL/L (ref 136–145)

## 2022-01-09 PROCEDURE — 74011250636 HC RX REV CODE- 250/636: Performed by: HOSPITALIST

## 2022-01-09 PROCEDURE — 80053 COMPREHEN METABOLIC PANEL: CPT

## 2022-01-09 PROCEDURE — 74011250637 HC RX REV CODE- 250/637: Performed by: HOSPITALIST

## 2022-01-09 PROCEDURE — 74011000250 HC RX REV CODE- 250: Performed by: HOSPITALIST

## 2022-01-09 PROCEDURE — 36415 COLL VENOUS BLD VENIPUNCTURE: CPT

## 2022-01-09 PROCEDURE — 71045 X-RAY EXAM CHEST 1 VIEW: CPT

## 2022-01-09 PROCEDURE — 74011250637 HC RX REV CODE- 250/637: Performed by: FAMILY MEDICINE

## 2022-01-09 PROCEDURE — 74011000258 HC RX REV CODE- 258: Performed by: HOSPITALIST

## 2022-01-09 PROCEDURE — 65270000029 HC RM PRIVATE

## 2022-01-09 RX ORDER — BACLOFEN 10 MG/1
5 TABLET ORAL 2 TIMES DAILY
Status: DISCONTINUED | OUTPATIENT
Start: 2022-01-09 | End: 2022-01-12 | Stop reason: HOSPADM

## 2022-01-09 RX ORDER — PANTOPRAZOLE SODIUM 40 MG/1
40 TABLET, DELAYED RELEASE ORAL
Status: DISCONTINUED | OUTPATIENT
Start: 2022-01-09 | End: 2022-01-12 | Stop reason: HOSPADM

## 2022-01-09 RX ADMIN — ACETAMINOPHEN 650 MG: 325 TABLET ORAL at 07:46

## 2022-01-09 RX ADMIN — CEFTRIAXONE 2 G: 2 INJECTION, POWDER, FOR SOLUTION INTRAMUSCULAR; INTRAVENOUS at 13:05

## 2022-01-09 RX ADMIN — SODIUM CHLORIDE, PRESERVATIVE FREE 10 ML: 5 INJECTION INTRAVENOUS at 13:05

## 2022-01-09 RX ADMIN — LISINOPRIL 10 MG: 5 TABLET ORAL at 07:43

## 2022-01-09 RX ADMIN — SODIUM CHLORIDE, PRESERVATIVE FREE 10 ML: 5 INJECTION INTRAVENOUS at 05:22

## 2022-01-09 RX ADMIN — GUAIFENESIN SYRUP AND DEXTROMETHORPHAN 5 ML: 100; 10 SYRUP ORAL at 23:39

## 2022-01-09 RX ADMIN — SODIUM CHLORIDE, PRESERVATIVE FREE 10 ML: 5 INJECTION INTRAVENOUS at 22:03

## 2022-01-09 RX ADMIN — AZITHROMYCIN MONOHYDRATE 250 MG: 250 TABLET ORAL at 07:45

## 2022-01-09 RX ADMIN — AMLODIPINE BESYLATE 10 MG: 10 TABLET ORAL at 07:43

## 2022-01-09 RX ADMIN — BARICITINIB 4 MG: 2 TABLET, FILM COATED ORAL at 07:44

## 2022-01-09 RX ADMIN — ENOXAPARIN SODIUM 40 MG: 100 INJECTION SUBCUTANEOUS at 13:04

## 2022-01-09 RX ADMIN — PANTOPRAZOLE SODIUM 40 MG: 40 TABLET, DELAYED RELEASE ORAL at 05:22

## 2022-01-09 RX ADMIN — DEXAMETHASONE 6 MG: 4 TABLET ORAL at 07:43

## 2022-01-09 RX ADMIN — Medication 500 MG: at 07:46

## 2022-01-09 RX ADMIN — Medication 500 MG: at 18:04

## 2022-01-09 RX ADMIN — BACLOFEN 5 MG: 10 TABLET ORAL at 15:03

## 2022-01-09 NOTE — PROGRESS NOTES
Patient complained of hiccups throughout the day. Dr. Yonas Emanuel notified, stated that it may be due to the decadron. orders to be placed.

## 2022-01-09 NOTE — PROGRESS NOTES
Received pt from RN [facility]) in stable condition. Pt in bed resting quietly. Resp even & unlabored on 6L NC; no acute signs of distress noted. Bed low & locked; call light in reach; no needs voiced.

## 2022-01-09 NOTE — PROGRESS NOTES
Hospitalist Progress Note   Admit Date:  2022 10:11 AM   Name:  Jeremy Simon   Age:  68 y.o. Sex:  male  :  1945   MRN:  [id number]   Room:  [facility]    Presenting Complaint: Positive For Covid-19    Reason(s) for Admission: Acute hypoxemic respiratory failure due to COVID-19 ([facility] [U07.1, J96.01]     Hospital Course & Interval History: This is a 60-year-old male with no significant prior past medical history presented to ER from emergency MD via EMS as he was found hypoxic with oxygen saturation of low 80s on room air. He was diagnosed with COVID-19 infection on . Patient was admitted for acute hypoxemic respiratory failure secondary to COVID-19 pneumonia. No fever no chills. Subjective (22):  2022  Complain of his chronic neck pain and dizziness, says has similar episodes at home which last for few hours. Says breathing better, was able to get up and go to restroom. Still requiring around 6 L/min    2022  Says chronic  dizziness better  Still requiring 6 lit/min  Spoke to son and update pts condition    2022  Complaining of hiccups  Shortness of breath on walking  Presently on 6 L/min    Assessment & Plan:   Acute hypoxemic respiratory failure due to COVID-19 [facility]) (2022)    Chest x-ray on admission shows multifocal airspace opacities. Patient is currently needing 5-6 L oxygen nasal cannula to maintain saturations greater than 88%. Not vaccinated against COVID-19. Desean Roland tested positive for Covid on 21. Continue dexamethasone.  Procalcitonin is 0.15.    Continue Ceftriaxone, azithromycin, Baricitinib. D-dimer is 1.88  CRP is 12. Home oxygen screen prior to discharge. Ordered gentle fluids due to diarrhea. 2022  Presently requiring 6 L/min  We will order chest x-ray    #Hiccups? Etiology  Will order baclofen. #Chronic neck pain with dizziness  Says has similar episode at home and last for few hours.   Says was told that no other surgery for his neck. 1/9/2022-improving    #Elevated blood pressure  We will add amlodipine and lisinopril  Continue as needed hydralazine  1/8/2022  stable       Hypokalemia (1/5/2022)  Resolved     Mild hyponatremia  Resolved.     Thrombocytopenia mild  Platelet count of 285 on admission. This am, 126.   No signs of active bleeding. Likely from infection.  Monitor for now. 1/8/2022  resolved     Mild transaminitis:  No abdominal pain.   Likely from [facility].       Dispo/Discharge Planning:    Depending on the oxygen requirements     Diet:  ADULT DIET Regular; Low Fat/Low Chol/High Fiber/2 gm Na  DVT PPx: Lovenox  Code status: Full Code      Hospital Problems as of 1/9/2022 Never Reviewed          Codes Class Noted - Resolved POA    HTN (hypertension) ICD-10-CM: I10  ICD-9-CM: 401.9  1/7/2022 - Present Unknown        * (Principal) Acute hypoxemic respiratory failure due to COVID-19 [facility]) ICD-10-CM: U07.1, J96.01  ICD-9-CM: 518.81, 079.89, 799.02  1/5/2022 - Present Unknown        Hypokalemia ICD-10-CM: E87.6  ICD-9-CM: 276.8  1/5/2022 - Present Unknown        Hyponatremia ICD-10-CM: E87.1  ICD-9-CM: 276.1  1/5/2022 - Present Unknown        Thrombocytopenia ([facility] ICD-10-CM: D69.6  ICD-9-CM: 287.5  1/5/2022 - Present Unknown              Objective:     Patient Vitals for the past 24 hrs:   Temp Pulse Resp BP SpO2   01/09/22 1119 97.9 °F (36.6 °C) 76 20 119/78 96 %   01/09/22 0730 97.9 °F (36.6 °C) 81 18 138/89 94 %   01/09/22 0532 97.3 °F (36.3 °C) 71 24 (!) 146/86 95 %   01/09/22 0058 97.4 °F (36.3 °C) 79 24 (!) 151/88 94 %   01/08/22 2055 97.5 °F (36.4 °C) 89 22 (!) 150/85 95 %   01/08/22 1631 97.8 °F (36.6 °C) 78 20 125/79 92 %     Oxygen Therapy  O2 Sat (%): 96 % (01/09/22 1119)  Pulse via Oximetry: 87 beats per minute (01/05/22 1055)  O2 Device: Nasal cannula (01/09/22 1119)  O2 Flow Rate (L/min): 6 l/min (01/08/22 2055)    Estimated body mass index is 38.74 kg/m² as calculated from the following:    Height as of this encounter: 5' 6\" (1.676 m). Weight as of this encounter: 108.9 kg (240 lb). No intake or output data in the 24 hours ending 01/09/22 1442      Physical Exam:     Blood pressure 119/78, pulse 76, temperature 97.9 °F (36.6 °C), resp. rate 20, height 5' 6\" (1.676 m), weight 108.9 kg (240 lb), SpO2 96 %. General:    Well nourished. Mild respiratory distress. Complaining of hiccups. presently on 6 L/min  Head:  Normocephalic, atraumatic  Eyes:  Sclerae appear normal.  Pupils equally round. ENT:  Nares appear normal, no drainage. Moist oral mucosa  Neck:  No restricted ROM. Trachea midline   CV:   RRR. No m/r/g. No jugular venous distension. Lungs:   Bilateral coarse breath sounds  Abdomen: Bowel sounds present. Soft, nontender, nondistended. Extremities: No cyanosis or clubbing. No edema  Skin:     No rashes and normal coloration. Warm and dry. Neuro:  CN II-XII grossly intact. Sensation intact. A&Ox3  Psych:  Normal mood and affect. I have reviewed ordered lab tests and independently visualized imaging below:    Recent Labs:  Recent Results (from the past 48 hour(s))   CBC WITH AUTOMATED DIFF    Collection Time: 01/07/22  4:30 PM   Result Value Ref Range    WBC 10.2 4.3 - 11.1 K/uL    RBC 5.41 4.23 - 5.6 M/uL    HGB 16.0 13.6 - 17.2 g/dL    HCT 46.8 41.1 - 50.3 %    MCV 86.5 79.6 - 97.8 FL    MCH 29.6 26.1 - 32.9 PG    MCHC 34.2 31.4 - 35.0 g/dL    RDW 13.0 11.9 - 14.6 %    PLATELET 662 798 - 214 K/uL    MPV 12.0 9.4 - 12.3 FL    ABSOLUTE NRBC 0.00 0.0 - 0.2 K/uL    DF AUTOMATED      NEUTROPHILS 78 43 - 78 %    LYMPHOCYTES 14 13 - 44 %    MONOCYTES 7 4.0 - 12.0 %    EOSINOPHILS 0 (L) 0.5 - 7.8 %    BASOPHILS 0 0.0 - 2.0 %    IMMATURE GRANULOCYTES 1 0.0 - 5.0 %    ABS. NEUTROPHILS 7.9 1.7 - 8.2 K/UL    ABS. LYMPHOCYTES 1.5 0.5 - 4.6 K/UL    ABS. MONOCYTES 0.7 0.1 - 1.3 K/UL    ABS. EOSINOPHILS 0.0 0.0 - 0.8 K/UL    ABS. BASOPHILS 0.0 0.0 - 0.2 K/UL    ABS. IMM.  GRANS. 0.1 0.0 - 0.5 K/UL METABOLIC PANEL, COMPREHENSIVE    Collection Time: 01/07/22  4:30 PM   Result Value Ref Range    Sodium 140 136 - 145 mmol/L    Potassium 4.6 3.5 - 5.1 mmol/L    Chloride 107 98 - 107 mmol/L    CO2 23 21 - 32 mmol/L    Anion gap 10 7 - 16 mmol/L    Glucose 172 (H) 65 - 100 mg/dL    BUN 23 8 - 23 MG/DL    Creatinine 0.92 0.8 - 1.5 MG/DL    GFR est AA >60 >60 ml/min/1.73m2    GFR est non-AA >60 >60 ml/min/1.73m2    Calcium 8.9 8.3 - 10.4 MG/DL    Bilirubin, total 0.4 0.2 - 1.1 MG/DL    ALT (SGPT) 59 12 - 65 U/L    AST (SGOT) 84 (H) 15 - 37 U/L    Alk. phosphatase 54 50 - 136 U/L    Protein, total 7.0 6.3 - 8.2 g/dL    Albumin 2.3 (L) 3.2 - 4.6 g/dL    Globulin 4.7 (H) 2.3 - 3.5 g/dL    A-G Ratio 0.5 (L) 1.2 - 3.5     METABOLIC PANEL, COMPREHENSIVE    Collection Time: 01/09/22  5:37 AM   Result Value Ref Range    Sodium 138 136 - 145 mmol/L    Potassium 4.5 3.5 - 5.1 mmol/L    Chloride 107 98 - 107 mmol/L    CO2 25 21 - 32 mmol/L    Anion gap 6 (L) 7 - 16 mmol/L    Glucose 150 (H) 65 - 100 mg/dL    BUN 24 (H) 8 - 23 MG/DL    Creatinine 0.74 (L) 0.8 - 1.5 MG/DL    GFR est AA >60 >60 ml/min/1.73m2    GFR est non-AA >60 >60 ml/min/1.73m2    Calcium 8.7 8.3 - 10.4 MG/DL    Bilirubin, total 0.5 0.2 - 1.1 MG/DL    ALT (SGPT) 67 (H) 12 - 65 U/L    AST (SGOT) 51 (H) 15 - 37 U/L    Alk. phosphatase 54 50 - 136 U/L    Protein, total 6.8 6.3 - 8.2 g/dL    Albumin 2.3 (L) 3.2 - 4.6 g/dL    Globulin 4.5 (H) 2.3 - 3.5 g/dL    A-G Ratio 0.5 (L) 1.2 - 3.5         All Micro Results     Procedure Component Value Units Date/Time    COVID-19 RAPID TEST [[id number]]  (Abnormal) Collected: 01/05/22 1128    Order Status: Completed Specimen: Nasopharyngeal Updated: 01/05/22 1204     Specimen source NASAL        Comment: RAPID ONLY        COVID-19 rapid test Detected        Comment:      The specimen is POSITIVE for SARS-CoV-2, the novel coronavirus associated with COVID-19.        This test has been authorized by the FDA under an Emergency Use Authorization (EUA) for use by authorized laboratories. Fact sheet for Healthcare Providers: ConventionUpdate.co.nz  Fact sheet for Patients: ConventionUpdate.co.nz       Methodology: Isothermal Nucleic Acid Amplification               Other Studies:  No results found.     Current Meds:  Current Facility-Administered Medications   Medication Dose Route Frequency    pantoprazole (PROTONIX) tablet 40 mg  40 mg Oral ACB    baclofen (LIORESAL) tablet 5 mg  5 mg Oral BID    sodium chloride (OCEAN) 0.65 % nasal squeeze bottle 2 Spray  2 Spray Both Nostrils Q2H PRN    amLODIPine (NORVASC) tablet 10 mg  10 mg Oral DAILY    lisinopriL (PRINIVIL, ZESTRIL) tablet 10 mg  10 mg Oral DAILY    Saccharomyces boulardii (FLORASTOR) capsule 500 mg  500 mg Oral BID    hydrALAZINE (APRESOLINE) 20 mg/mL injection 10 mg  10 mg IntraVENous Q6H PRN    sodium chloride (NS) flush 5-40 mL  5-40 mL IntraVENous Q8H    sodium chloride (NS) flush 5-40 mL  5-40 mL IntraVENous PRN    acetaminophen (TYLENOL) tablet 650 mg  650 mg Oral Q6H PRN    Or    acetaminophen (TYLENOL) suppository 650 mg  650 mg Rectal Q6H PRN    polyethylene glycol (MIRALAX) packet 17 g  17 g Oral DAILY PRN    ondansetron (ZOFRAN ODT) tablet 4 mg  4 mg Oral Q8H PRN    Or    ondansetron (ZOFRAN) injection 4 mg  4 mg IntraVENous Q6H PRN    guaiFENesin-dextromethorphan (ROBITUSSIN DM) 100-10 mg/5 mL syrup 5 mL  5 mL Oral Q4H PRN    dexAMETHasone (DECADRON) tablet 6 mg  6 mg Oral DAILY    baricitinib (OLUMIANT) tablet 4 mg  4 mg Oral DAILY    enoxaparin (LOVENOX) injection 40 mg  40 mg SubCUTAneous Q24H    influenza vaccine 2021-22 (6 mos+)(PF) (FLUARIX/FLULAVAL/FLUZONE QUAD) injection 0.5 mL  1 Each IntraMUSCular PRIOR TO DISCHARGE       Signed:  Isabelle Ahumada MD

## 2022-01-09 NOTE — PROGRESS NOTES
Problem: Airway Clearance - Ineffective  Goal: Achieve or maintain patent airway  Outcome: Progressing Towards Goal     Problem: Gas Exchange - Impaired  Goal: Absence of hypoxia  Outcome: Progressing Towards Goal  Goal: Promote optimal lung function  Outcome: Progressing Towards Goal     Problem: Breathing Pattern - Ineffective  Goal: Ability to achieve and maintain a regular respiratory rate  Outcome: Progressing Towards Goal     Problem: Body Temperature -  Risk of, Imbalanced  Goal: Ability to maintain a body temperature within defined limits  Outcome: Progressing Towards Goal  Goal: Will regain or maintain usual level of consciousness  Outcome: Progressing Towards Goal  Goal: Complications related to the disease process, condition or treatment will be avoided or minimized  Outcome: Progressing Towards Goal     Problem: Isolation Precautions - Risk of Spread of Infection  Goal: Prevent transmission of infectious organism to others  Outcome: Progressing Towards Goal     Problem: Nutrition Deficits  Goal: Optimize nutrtional status  Outcome: Progressing Towards Goal     Problem: Risk for Fluid Volume Deficit  Goal: Maintain normal heart rhythm  Outcome: Progressing Towards Goal  Goal: Maintain absence of muscle cramping  Outcome: Progressing Towards Goal  Goal: Maintain normal serum potassium, sodium, calcium, phosphorus, and pH  Outcome: Progressing Towards Goal     Problem: Loneliness or Risk for Loneliness  Goal: Demonstrate positive use of time alone when socialization is not possible  Outcome: Progressing Towards Goal     Problem: Fatigue  Goal: Verbalize increase energy and improved vitality  Outcome: Progressing Towards Goal     Problem: Patient Education: Go to Patient Education Activity  Goal: Patient/Family Education  Outcome: Progressing Towards Goal     Problem: Falls - Risk of  Goal: *Absence of Falls  Description: Document Jaqueline Fall Risk and appropriate interventions in the flowsheet.   Outcome: Progressing Towards Goal  Note: Fall Risk Interventions:  Mobility Interventions: Patient to call before getting OOB         Medication Interventions: Patient to call before getting OOB,Teach patient to arise slowly    Elimination Interventions: Call light in reach,Toileting schedule/hourly rounds,Urinal in reach

## 2022-01-10 LAB
ALBUMIN SERPL-MCNC: 2.7 G/DL (ref 3.2–4.6)
ALBUMIN/GLOB SERPL: 0.6 {RATIO} (ref 1.2–3.5)
ALP SERPL-CCNC: 56 U/L (ref 50–136)
ALT SERPL-CCNC: 89 U/L (ref 12–65)
ANION GAP SERPL CALC-SCNC: 7 MMOL/L (ref 7–16)
AST SERPL-CCNC: 52 U/L (ref 15–37)
BILIRUB SERPL-MCNC: 0.6 MG/DL (ref 0.2–1.1)
BUN SERPL-MCNC: 26 MG/DL (ref 8–23)
CALCIUM SERPL-MCNC: 8.8 MG/DL (ref 8.3–10.4)
CHLORIDE SERPL-SCNC: 104 MMOL/L (ref 98–107)
CO2 SERPL-SCNC: 25 MMOL/L (ref 21–32)
CREAT SERPL-MCNC: 0.99 MG/DL (ref 0.8–1.5)
GLOBULIN SER CALC-MCNC: 4.7 G/DL (ref 2.3–3.5)
GLUCOSE SERPL-MCNC: 153 MG/DL (ref 65–100)
POTASSIUM SERPL-SCNC: 4.5 MMOL/L (ref 3.5–5.1)
PROT SERPL-MCNC: 7.4 G/DL (ref 6.3–8.2)
SODIUM SERPL-SCNC: 136 MMOL/L (ref 138–145)

## 2022-01-10 PROCEDURE — 97530 THERAPEUTIC ACTIVITIES: CPT

## 2022-01-10 PROCEDURE — 36415 COLL VENOUS BLD VENIPUNCTURE: CPT

## 2022-01-10 PROCEDURE — 74011250637 HC RX REV CODE- 250/637: Performed by: HOSPITALIST

## 2022-01-10 PROCEDURE — 80053 COMPREHEN METABOLIC PANEL: CPT

## 2022-01-10 PROCEDURE — 74011250637 HC RX REV CODE- 250/637: Performed by: FAMILY MEDICINE

## 2022-01-10 PROCEDURE — 97535 SELF CARE MNGMENT TRAINING: CPT

## 2022-01-10 PROCEDURE — 65270000029 HC RM PRIVATE

## 2022-01-10 PROCEDURE — 74011250636 HC RX REV CODE- 250/636: Performed by: HOSPITALIST

## 2022-01-10 PROCEDURE — 74011000250 HC RX REV CODE- 250: Performed by: HOSPITALIST

## 2022-01-10 RX ORDER — CHLORPROMAZINE HYDROCHLORIDE 10 MG/1
10 TABLET, FILM COATED ORAL ONCE
Status: COMPLETED | OUTPATIENT
Start: 2022-01-10 | End: 2022-01-10

## 2022-01-10 RX ADMIN — ENOXAPARIN SODIUM 40 MG: 100 INJECTION SUBCUTANEOUS at 13:04

## 2022-01-10 RX ADMIN — Medication 500 MG: at 17:37

## 2022-01-10 RX ADMIN — SODIUM CHLORIDE, PRESERVATIVE FREE 10 ML: 5 INJECTION INTRAVENOUS at 14:00

## 2022-01-10 RX ADMIN — BACLOFEN 5 MG: 10 TABLET ORAL at 08:29

## 2022-01-10 RX ADMIN — PANTOPRAZOLE SODIUM 40 MG: 40 TABLET, DELAYED RELEASE ORAL at 06:30

## 2022-01-10 RX ADMIN — SODIUM CHLORIDE, PRESERVATIVE FREE 10 ML: 5 INJECTION INTRAVENOUS at 06:29

## 2022-01-10 RX ADMIN — SODIUM CHLORIDE, PRESERVATIVE FREE 10 ML: 5 INJECTION INTRAVENOUS at 23:36

## 2022-01-10 RX ADMIN — Medication 500 MG: at 08:29

## 2022-01-10 RX ADMIN — LISINOPRIL 10 MG: 5 TABLET ORAL at 08:29

## 2022-01-10 RX ADMIN — BARICITINIB 4 MG: 2 TABLET, FILM COATED ORAL at 08:29

## 2022-01-10 RX ADMIN — BACLOFEN 5 MG: 10 TABLET ORAL at 17:37

## 2022-01-10 RX ADMIN — AMLODIPINE BESYLATE 10 MG: 10 TABLET ORAL at 08:29

## 2022-01-10 RX ADMIN — CHLORPROMAZINE HYDROCHLORIDE 10 MG: 10 TABLET, FILM COATED ORAL at 13:04

## 2022-01-10 RX ADMIN — DEXAMETHASONE 6 MG: 4 TABLET ORAL at 08:29

## 2022-01-10 NOTE — PROGRESS NOTES
ACUTE OT GOALS:  (Developed with and agreed upon by patient and/or caregiver.)  1) Patient will complete lower body bathing and dressing with Mod I and adaptive equipment as needed. 2) Patient will complete toileting with independence. 3) Patient will complete functional transfers with independence and adaptive equipment as needed. 4) Patient will tolerate at least 23 minutes of OT activity with less than 2 rest breaks while maintaining O2 sats >90%. 5) Patient will verbalize at least 3 energy conservation technique to utilize during ADL/IADL. Timeframe: 7 visits     OCCUPATIONAL THERAPY: Daily Note    OT Treatment Day # 1    Geri Chaudhary is a 68 y.o. male   PRIMARY DIAGNOSIS: Acute hypoxemic respiratory failure due to COVID-19 [facility]  Acute hypoxemic respiratory failure due to COVID-19 ([facility] [U07.1, J96.01]       Reason for Referral:    ICD-10: Treatment Diagnosis: Generalized Muscle Weakness (M62.81)  Other lack of cordination (R27.8)  [place]PAT[place] / [place] / Product Type: Medicare /   ASSESSMENT:     REHAB RECOMMENDATIONS:   Recommendation to date pending progress:  Setting:   No further skilled therapy    versus home health pending progress  Equipment:    None     PRIOR LEVEL OF FUNCTION:  (Prior to Hospitalization)  INITIAL/CURRENT LEVEL OF FUNCTION:  (Based on most recently demonstrated)   Bathing:   Independent  Dressing:   Independent  Feeding/Grooming:   Independent  Toileting:   Independent  Functional Mobility:   Independent Bathing:   Not tested  Dressing:   Not tested  Feeding/Grooming:   Set Up  Toileting:   Supervision  Functional Mobility:   Supervision     ASSESSMENT:  Mr. Lesley Bower is a 69 YO R dominant WM admitted from home with increased SOB and oxygen sat levels dropping, found to have COVID-19. Pt was sitting in chair upon arrival. Pt completed functional mobility with supervision.  Pt completed toileting and grooming with supervision. Continue POC. SUBJECTIVE:   Mr. Duane Huff states, \"Hey. \"    SOCIAL HISTORY/LIVING ENVIRONMENT: lives with spouse in 1 level home with 1 step at entrance, no HRs. Pt has 2 T/S combos with GBs and HH nozzle. Son is working on converting master bathroom to walk in shower with built in seat. Independent with ADLs, IADLs, driving, no home oxygen, no falls. No DME owned.   Home Environment: Private residence  # Steps to Enter: 1  One/Two Story Residence: One story  Living Alone: No  Support Systems: Spouse/Significant Other,Other Family Member(s),Child(joy)    OBJECTIVE:     PAIN: VITAL SIGNS: LINES/DRAINS:   Pre Treatment: Pain Screen  Pain Scale 1: Numeric (0 - 10)  Pain Intensity 1: 0  Post Treatment: no complaint of pain   IV  O2 Device: Nasal cannula       ACTIVITIES OF DAILY LIVING: I Mod I S SBA CGA Min Mod Max Total NT Comments   BASIC ADLs:              Bathing/ Showering [] [] [] [] [] [] [] [] [] []    Toileting [] [] [x] [] [] [] [] [] [] []    Dressing [] [] [] [] [] [] [] [] [] []    Feeding [] [] [] [] [] [] [] [] [] []    Grooming [] [] [x] [] [] [] [] [] [] []    Personal Device Care [] [] [] [] [] [] [] [] [] []    Functional Mobility [] [] [x] [] [] [] [] [] [] [] No DME   I=Independent, Mod I=Modified Independent, S=Supervision, SBA=Standby Assistance, CGA=Contact Guard Assistance,   Min=Minimal Assistance, Mod=Moderate Assistance, Max=Maximal Assistance, Total=Total Assistance, NT=Not Tested    MOBILITY: I Mod I S SBA CGA Min Mod Max Total  NT x2 Comments:   Supine to sit [] [] [] [] [] [] [] [] [] [] []    Sit to supine [] [] [] [] [] [] [] [] [] [] []    Sit to stand [] [] [x] [] [] [] [] [] [] [] []    Bed to chair [] [] [] [] [] [] [] [] [] [] []    I=Independent, Mod I=Modified Independent, S=Supervision, SBA=Standby Assistance, CGA=Contact Guard Assistance,   Min=Minimal Assistance, Mod=Moderate Assistance, Max=Maximal Assistance, Total=Total Assistance, NT=Not Tested    [employer] 6 Clicks   Daily Activity Inpatient Short Form        How much help from another person does the patient currently need. .. Total A Lot A Little None   1. Putting on and taking off regular lower body clothing? [] 1   [] 2   [x] 3   [] 4   2. Bathing (including washing, rinsing, drying)? [] 1   [] 2   [x] 3   [] 4   3. Toileting, which includes using toilet, bedpan or urinal?   [] 1   [] 2   [x] 3   [] 4   4. Putting on and taking off regular upper body clothing? [] 1   [] 2   [x] 3   [] 4   5. Taking care of personal grooming such as brushing teeth? [] 1   [] 2   [x] 3   [] 4   6. Eating meals? [] 1   [] 2   [] 3   [x] 4   © 2007, Trustees [address] [address], under license to [employer]. All rights reserved     Score:  Initial: 19, completed 01/06/2022 Most Recent: X (Date: -- )   Interpretation of Tool:  Represents activities that are increasingly more difficult (i.e. Bed mobility, Transfers, Gait). PLAN:   FREQUENCY/DURATION: OT Plan of Care: 3 times/week for duration of hospital stay or until stated goals are met, whichever comes first.    PROBLEM LIST:   (Skilled intervention is medically necessary to address:)  1. Decreased ADL/Functional Activities  2. Decreased Activity Tolerance  3. Decreased Balance  4. Decreased Coordination  5. Decreased Transfer Abilities   INTERVENTIONS PLANNED:   (Benefits and precautions of occupational therapy have been discussed with the patient.)  1. Self Care Training  2. Therapeutic Activity  3. Therapeutic Exercise/HEP  4. Neuromuscular Re-education  5. Education     TREATMENT:         TREATMENT:   ($$ Self Care/Home Management: 8-22 mins    )  Self Care (13 Minutes): Self care including Toileting and Grooming to increase independence and decrease level of assistance required.     TREATMENT GRID:  N/A    AFTER TREATMENT POSITION/PRECAUTIONS:  Chair, Needs within reach and RN notified    INTERDISCIPLINARY COLLABORATION:  RN/PCT, PT/PTA and OT/GARZA    TOTAL TREATMENT DURATION:    OT Patient Time In/Time Out  Time In: 1426  Time Out: [address]  Caroline Payan

## 2022-01-10 NOTE — PROGRESS NOTES
LOS 4d  Chart reviewed by [facility].   02 demand 7L/NC  Will continue to follow for discharge planning needs  Please consult  if any new issues arise  Discharge plan is discharge home with possible home 02

## 2022-01-10 NOTE — PROGRESS NOTES
Hospitalist Progress Note   Admit Date:  2022 10:11 AM   Name:  Josemanuel Crowe   Age:  68 y.o. Sex:  male  :  1945   MRN:  [id number]   Room:  [facility]    Presenting Complaint: Positive For Covid-19    Reason(s) for Admission: Acute hypoxemic respiratory failure due to COVID-19 ([facility] [U07.1, J96.01]     Hospital Course & Interval History: This is a 51-year-old male with no significant prior past medical history presented to ER from emergency MD via EMS as he was found hypoxic with oxygen saturation of low 80s on room air. He was diagnosed with COVID-19 infection on . Patient was admitted for acute hypoxemic respiratory failure secondary to COVID-19 pneumonia. No fever no chills. Subjective (01/10/22):  2022  Complain of his chronic neck pain and dizziness, says has similar episodes at home which last for few hours. Says breathing better, was able to get up and go to restroom. Still requiring around 6 L/min    2022  Says chronic  dizziness better  Still requiring 6 lit/min  Spoke to son and update pts condition    2022  Complaining of hiccups  Shortness of breath on walking  Presently on 6 L/min    1/10/2022  Still having hiccups, was placed on baclofen yesterday  Complaining of mild shortness of breath on walking to restroom. Presently on 7 L/min  Spoke to son and he is    Assessment & Plan:   Acute hypoxemic respiratory failure due to COVID-19 [facility]) (2022)    Chest x-ray on admission shows multifocal airspace opacities. Patient is currently needing 5-6 L oxygen nasal cannula to maintain saturations greater than 88%. Not vaccinated against COVID-19. [facility] tested positive for Covid on 21. Continue dexamethasone.  Procalcitonin is 0.15.    Continue Ceftriaxone, azithromycin, Baricitinib. D-dimer is 1.88  CRP is 12. Home oxygen screen prior to discharge. Ordered gentle fluids due to diarrhea.   2022  Presently requiring 6 L/min  We will order chest x-ray  1/10/2022  Initially on 7 L/min RT in room oxygen saturation 94-96, wean back to 6 L/min  Chest x-ray stable findings no worsening COVID-pneumonia. #Hiccups? Etiology  Will order baclofen. 1/10/2022  Added one-time small dose of 10 mg Thorazine    #Chronic neck pain with dizziness  Says has similar episode at home and last for few hours. Says was told that no other surgery for his neck. 1/9/2022-improving    #Elevated blood pressure  We will add amlodipine and lisinopril  Continue as needed hydralazine  1/8/2022  stable       Hypokalemia (1/5/2022)  Resolved     Mild hyponatremia  Resolved.     Thrombocytopenia mild  Platelet count of 856 on admission. This am, 126.   No signs of active bleeding. Likely from infection.  Monitor for now. 1/8/2022  resolved     Mild transaminitis:  No abdominal pain.   Likely from [facility] 23.       Dispo/Discharge Planning:    Depending on the oxygen requirements     Diet:  ADULT DIET Regular; Low Fat/Low Chol/High Fiber/2 gm Na  DVT PPx: Lovenox  Code status: Full Code      Hospital Problems as of 1/10/2022 Never Reviewed          Codes Class Noted - Resolved POA    HTN (hypertension) ICD-10-CM: I10  ICD-9-CM: 401.9  1/7/2022 - Present Unknown        * (Principal) Acute hypoxemic respiratory failure due to COVID-19 [facility]) ICD-10-CM: U07.1, J96.01  ICD-9-CM: 518.81, 079.89, 799.02  1/5/2022 - Present Unknown        Hypokalemia ICD-10-CM: E87.6  ICD-9-CM: 276.8  1/5/2022 - Present Unknown        Hyponatremia ICD-10-CM: E87.1  ICD-9-CM: 276.1  1/5/2022 - Present Unknown        Thrombocytopenia ([facility] ICD-10-CM: D69.6  ICD-9-CM: 287.5  1/5/2022 - Present Unknown              Objective:     Patient Vitals for the past 24 hrs:   Temp Pulse Resp BP SpO2   01/10/22 1201 97.3 °F (36.3 °C) 82 20 138/77 96 %   01/10/22 0759 97.4 °F (36.3 °C) 71 20 119/82 95 %   01/10/22 0403 97.4 °F (36.3 °C) 77  (!) 154/89 92 %   01/09/22 2323 97.6 °F (36.4 °C) 87  (!) 157/77 95 %   01/09/22 1946 97.9 °F (36.6 °C) 93  (!) 157/89 93 %   01/09/22 1654 98.6 °F (37 °C) 74 20 122/76 98 %     Oxygen Therapy  O2 Sat (%): 96 % (01/10/22 1201)  Pulse via Oximetry: 87 beats per minute (01/05/22 1055)  O2 Device: Nasal cannula (01/09/22 1654)  O2 Flow Rate (L/min): 6 l/min (01/08/22 2055)    Estimated body mass index is 38.74 kg/m² as calculated from the following:    Height as of this encounter: 5' 6\" (1.676 m). Weight as of this encounter: 108.9 kg (240 lb). Intake/Output Summary (Last 24 hours) at 1/10/2022 1228  Last data filed at 1/9/2022 1654  Gross per 24 hour   Intake    Output 250 ml   Net -250 ml         Physical Exam:     Blood pressure 138/77, pulse 82, temperature 97.3 °F (36.3 °C), resp. rate 20, height 5' 6\" (1.676 m), weight 108.9 kg (240 lb), SpO2 96 %. General:    Well nourished. Mild respiratory distress, sitting in chair, on 7 L/min, decreased back to 6 L/min. Mild hiccups. head:  Normocephalic, atraumatic  Eyes:  Sclerae appear normal.  Pupils equally round. ENT:  Nares appear normal, no drainage. Moist oral mucosa  Neck:  No restricted ROM. Trachea midline   CV:   RRR. No m/r/g. No jugular venous distension. Lungs:   Bilateral coarse breath sounds  Abdomen: Bowel sounds present. Soft, nontender, nondistended. Extremities: No cyanosis or clubbing. No edema  Skin:     No rashes and normal coloration. Warm and dry. Neuro:  CN II-XII grossly intact. Sensation intact. A&Ox3  Psych:  Normal mood and affect.       I have reviewed ordered lab tests and independently visualized imaging below:    Recent Labs:  Recent Results (from the past 48 hour(s))   METABOLIC PANEL, COMPREHENSIVE    Collection Time: 01/09/22  5:37 AM   Result Value Ref Range    Sodium 138 136 - 145 mmol/L    Potassium 4.5 3.5 - 5.1 mmol/L    Chloride 107 98 - 107 mmol/L    CO2 25 21 - 32 mmol/L    Anion gap 6 (L) 7 - 16 mmol/L    Glucose 150 (H) 65 - 100 mg/dL    BUN 24 (H) 8 - 23 MG/DL    Creatinine 0.74 (L) 0.8 - 1.5 MG/DL    GFR est AA >60 >60 ml/min/1.73m2    GFR est non-AA >60 >60 ml/min/1.73m2    Calcium 8.7 8.3 - 10.4 MG/DL    Bilirubin, total 0.5 0.2 - 1.1 MG/DL    ALT (SGPT) 67 (H) 12 - 65 U/L    AST (SGOT) 51 (H) 15 - 37 U/L    Alk. phosphatase 54 50 - 136 U/L    Protein, total 6.8 6.3 - 8.2 g/dL    Albumin 2.3 (L) 3.2 - 4.6 g/dL    Globulin 4.5 (H) 2.3 - 3.5 g/dL    A-G Ratio 0.5 (L) 1.2 - 3.5     METABOLIC PANEL, COMPREHENSIVE    Collection Time: 01/10/22  5:59 AM   Result Value Ref Range    Sodium 136 (L) 138 - 145 mmol/L    Potassium 4.5 3.5 - 5.1 mmol/L    Chloride 104 98 - 107 mmol/L    CO2 25 21 - 32 mmol/L    Anion gap 7 7 - 16 mmol/L    Glucose 153 (H) 65 - 100 mg/dL    BUN 26 (H) 8 - 23 MG/DL    Creatinine 0.99 0.8 - 1.5 MG/DL    GFR est AA >60 >60 ml/min/1.73m2    GFR est non-AA >60 >60 ml/min/1.73m2    Calcium 8.8 8.3 - 10.4 MG/DL    Bilirubin, total 0.6 0.2 - 1.1 MG/DL    ALT (SGPT) 89 (H) 12 - 65 U/L    AST (SGOT) 52 (H) 15 - 37 U/L    Alk. phosphatase 56 50 - 136 U/L    Protein, total 7.4 6.3 - 8.2 g/dL    Albumin 2.7 (L) 3.2 - 4.6 g/dL    Globulin 4.7 (H) 2.3 - 3.5 g/dL    A-G Ratio 0.6 (L) 1.2 - 3.5         All Micro Results     Procedure Component Value Units Date/Time    COVID-19 RAPID TEST [[id number]]  (Abnormal) Collected: 01/05/22 1128    Order Status: Completed Specimen: Nasopharyngeal Updated: 01/05/22 1204     Specimen source NASAL        Comment: RAPID ONLY        COVID-19 rapid test Detected        Comment:      The specimen is POSITIVE for SARS-CoV-2, the novel coronavirus associated with COVID-19. This test has been authorized by the FDA under an Emergency Use Authorization (EUA) for use by authorized laboratories.         Fact sheet for Healthcare Providers: ConventionUpdate.co.nz  Fact sheet for Patients: ConventionUpdate.co.nz       Methodology: Isothermal Nucleic Acid Amplification               Other Studies:  XR CHEST SNGL V    Result Date: 1/9/2022  Portable chest x-ray CLINICAL INDICATION: Hypoxia and dyspnea FINDINGS: Single AP view of the chest compared to a similar exam dated 1/5/2022 shows diffuse reticular nodular interstitial densities noted throughout both lungs in keeping with atypical pneumonia not significant change from the prior exam. There is no pleural effusion. The cardiac silhouette and mediastinum are unremarkable. The bones are normal.     Stable bilateral atypical pneumonia pattern.       Current Meds:  Current Facility-Administered Medications   Medication Dose Route Frequency    chlorproMAZINE (THORAZINE) tablet 10 mg  10 mg Oral ONCE    pantoprazole (PROTONIX) tablet 40 mg  40 mg Oral ACB    baclofen (LIORESAL) tablet 5 mg  5 mg Oral BID    sodium chloride (OCEAN) 0.65 % nasal squeeze bottle 2 Spray  2 Spray Both Nostrils Q2H PRN    amLODIPine (NORVASC) tablet 10 mg  10 mg Oral DAILY    lisinopriL (PRINIVIL, ZESTRIL) tablet 10 mg  10 mg Oral DAILY    Saccharomyces boulardii (FLORASTOR) capsule 500 mg  500 mg Oral BID    hydrALAZINE (APRESOLINE) 20 mg/mL injection 10 mg  10 mg IntraVENous Q6H PRN    sodium chloride (NS) flush 5-40 mL  5-40 mL IntraVENous Q8H    sodium chloride (NS) flush 5-40 mL  5-40 mL IntraVENous PRN    acetaminophen (TYLENOL) tablet 650 mg  650 mg Oral Q6H PRN    Or    acetaminophen (TYLENOL) suppository 650 mg  650 mg Rectal Q6H PRN    polyethylene glycol (MIRALAX) packet 17 g  17 g Oral DAILY PRN    ondansetron (ZOFRAN ODT) tablet 4 mg  4 mg Oral Q8H PRN    Or    ondansetron (ZOFRAN) injection 4 mg  4 mg IntraVENous Q6H PRN    guaiFENesin-dextromethorphan (ROBITUSSIN DM) 100-10 mg/5 mL syrup 5 mL  5 mL Oral Q4H PRN    dexAMETHasone (DECADRON) tablet 6 mg  6 mg Oral DAILY    baricitinib (OLUMIANT) tablet 4 mg  4 mg Oral DAILY    enoxaparin (LOVENOX) injection 40 mg  40 mg SubCUTAneous Q24H    influenza vaccine 2021-22 (6 mos+)(PF) (FLUARIX/FLULAVAL/FLUZONE QUAD) injection 0.5 mL  1 Each IntraMUSCular PRIOR TO DISCHARGE       Signed:  Mya Boyd MD

## 2022-01-10 NOTE — PROGRESS NOTES
ACUTE PHYSICAL THERAPY GOALS:  (Developed with and agreed upon by patient and/or caregiver.)  (1.)Mr. Michelle will move from supine to sit and sit to supine  in bed with INDEPENDENCE within 7 treatment day(s). (2.)Mr. Michelle will transfer from bed to chair and chair to bed with INDEPENDENCE using the least restrictive device within 7 treatment day(s). (3.)Mr. Michelle will ambulate with INDEPENDENCE for 400 feet with the least restrictive device within 7 treatment day(s). (4.)Mr. Michelle will perform standing static and dynamic balance activities x 23 minutes with SUPERVISION to improve safety within 7 treatment day(s). (5.)Mr. Michelle will ambulate and/or perform functional activities for  23 consecutive minutes with stable vital signs and no rests required to improve activity tolerance within 7 treatment days. PHYSICAL THERAPY: Daily Note and PM Treatment Day # 2    Erica Eldridge is a 68 y.o. male   PRIMARY DIAGNOSIS: Acute hypoxemic respiratory failure due to COVID-19 [facility])  Acute hypoxemic respiratory failure due to COVID-19 ([facility] [U07.1, J96.01]         ASSESSMENT:     REHAB RECOMMENDATIONS: CURRENT LEVEL OF FUNCTION:  (Most Recently Demonstrated)   Recommendation to date pending progress:  Setting:   No further skilled therapy   Equipment:    To Be Determined Bed Mobility:   Not tested  Sit to Stand:   Standby Assistance  Transfers:   Standby Assistance  Gait/Mobility:   Standby Assistance     ASSESSMENT:  Mr. Britta Escobar was sitting up in chair. He stood and amb 15' x 3 with no AD and SBA. He then worked on standing balance activity. SUBJECTIVE:   Mr. 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 \"    SOCIAL HISTORY/ LIVING ENVIRONMENT: lives with wife in 1 story home, 1/2 step to enter. PLOF indep with ADLs, mobility. No DME.   Home Environment: Private residence  # Steps to Enter: 1  One/Two Story Residence: One story  Living Alone: No  Support Systems: Spouse/Significant Other,Other Family Member(s),Child(joy)  OBJECTIVE:     PAIN: VITAL SIGNS: LINES/DRAINS:   Pre Treatment:  0  Post Treatment: 0   none  O2 Device: Nasal cannula     MOBILITY: I Mod I S SBA CGA Min Mod Max Total  NT x2 Comments:   Bed Mobility    Rolling [] [] [] [] [] [] [] [] [] [x] []    Supine to Sit [] [] [] [] [] [] [] [] [] [x] []    Scooting [] [] [] [] [] [] [] [] [] [x] []    Sit to Supine [] [] [] [] [] [] [] [] [] [x] []    Transfers    Sit to Stand [] [] [] [x] [] [] [] [] [] [] []    Bed to Chair [] [] [] [x] [] [] [] [] [] [] []    Stand to Sit [] [] [] [x] [] [] [] [] [] [] []    I=Independent, Mod I=Modified Independent, S=Supervision, SBA=Standby Assistance, CGA=Contact Guard Assistance,   Min=Minimal Assistance, Mod=Moderate Assistance, Max=Maximal Assistance, Total=Total Assistance, NT=Not Tested    BALANCE: Good Fair+ Fair Fair- Poor NT Comments   Sitting Static [x] [] [] [] [] []    Sitting Dynamic [] [] [] [] [] []              Standing Static [] [x] [] [] [] []    Standing Dynamic [] [] [x] [] [] []      GAIT: I Mod I S SBA CGA Min Mod Max Total  NT x2 Comments:   Level of Assistance [] [] [] [] [] [] [] [] [] [] []    Distance 15' x 3    DME None    Gait Quality     Weightbearing  Status N/A     I=Independent, Mod I=Modified Independent, S=Supervision, SBA=Standby Assistance, CGA=Contact Guard Assistance,   Min=Minimal Assistance, Mod=Moderate Assistance, Max=Maximal Assistance, Total=Total Assistance, NT=Not Tested    PLAN:   FREQUENCY/DURATION: PT Plan of Care: 2 times/week for duration of hospital stay or until stated goals are met, whichever comes first.  TREATMENT:     TREATMENT:   ($$ Therapeutic Activity: 8-22 mins    )  Therapeutic Activity (13 Minutes): Therapeutic activity included Scooting, Transfer Training, Ambulation on level ground, Sitting balance  and Standing balance to improve functional Mobility, Strength and Activity tolerance.     TREATMENT GRID:  N/A    AFTER TREATMENT POSITION/PRECAUTIONS:  Chair    INTERDISCIPLINARY COLLABORATION:  RN/PCT, PT/PTA and OT/GARZA    TOTAL TREATMENT DURATION:  PT Patient Time In/Time Out  Time In: 1426  Time Out: [address] KRISTA Howard

## 2022-01-11 PROCEDURE — 77010033678 HC OXYGEN DAILY

## 2022-01-11 PROCEDURE — 74011000250 HC RX REV CODE- 250: Performed by: HOSPITALIST

## 2022-01-11 PROCEDURE — 74011250637 HC RX REV CODE- 250/637: Performed by: FAMILY MEDICINE

## 2022-01-11 PROCEDURE — 94760 N-INVAS EAR/PLS OXIMETRY 1: CPT

## 2022-01-11 PROCEDURE — 65270000029 HC RM PRIVATE

## 2022-01-11 PROCEDURE — 74011250637 HC RX REV CODE- 250/637: Performed by: HOSPITALIST

## 2022-01-11 PROCEDURE — 74011250636 HC RX REV CODE- 250/636: Performed by: HOSPITALIST

## 2022-01-11 RX ORDER — GUAIFENESIN 600 MG/1
600 TABLET, EXTENDED RELEASE ORAL EVERY 12 HOURS
Status: DISCONTINUED | OUTPATIENT
Start: 2022-01-11 | End: 2022-01-12 | Stop reason: HOSPADM

## 2022-01-11 RX ADMIN — SODIUM CHLORIDE, PRESERVATIVE FREE 10 ML: 5 INJECTION INTRAVENOUS at 13:51

## 2022-01-11 RX ADMIN — GUAIFENESIN 600 MG: 600 TABLET ORAL at 21:27

## 2022-01-11 RX ADMIN — Medication 500 MG: at 09:20

## 2022-01-11 RX ADMIN — DEXAMETHASONE 6 MG: 4 TABLET ORAL at 09:21

## 2022-01-11 RX ADMIN — Medication 500 MG: at 17:19

## 2022-01-11 RX ADMIN — SODIUM CHLORIDE, PRESERVATIVE FREE 10 ML: 5 INJECTION INTRAVENOUS at 06:09

## 2022-01-11 RX ADMIN — GUAIFENESIN 600 MG: 600 TABLET ORAL at 14:18

## 2022-01-11 RX ADMIN — ENOXAPARIN SODIUM 40 MG: 100 INJECTION SUBCUTANEOUS at 13:50

## 2022-01-11 RX ADMIN — AMLODIPINE BESYLATE 10 MG: 10 TABLET ORAL at 09:20

## 2022-01-11 RX ADMIN — BACLOFEN 5 MG: 10 TABLET ORAL at 17:18

## 2022-01-11 RX ADMIN — PANTOPRAZOLE SODIUM 40 MG: 40 TABLET, DELAYED RELEASE ORAL at 06:10

## 2022-01-11 RX ADMIN — BARICITINIB 4 MG: 2 TABLET, FILM COATED ORAL at 09:20

## 2022-01-11 RX ADMIN — BACLOFEN 5 MG: 10 TABLET ORAL at 09:20

## 2022-01-11 RX ADMIN — SODIUM CHLORIDE, PRESERVATIVE FREE 10 ML: 5 INJECTION INTRAVENOUS at 21:27

## 2022-01-11 RX ADMIN — LISINOPRIL 10 MG: 5 TABLET ORAL at 09:20

## 2022-01-11 RX ADMIN — GUAIFENESIN SYRUP AND DEXTROMETHORPHAN 5 ML: 100; 10 SYRUP ORAL at 02:49

## 2022-01-11 NOTE — PROGRESS NOTES
Hospitalist Progress Note   Admit Date:  2022 10:11 AM   Name:  Pop Duran   Age:  68 y.o. Sex:  male  :  1945   MRN:  [id number]   Room:  [facility]    Presenting Complaint: Positive For Covid-19    Reason(s) for Admission: [facility] hypoxemic respiratory [facility] [U07.1, J96.01]     Hospital Course & Interval History: This is a 77-year-old male with no significant prior past medical history presented to ER from emergency MD via EMS as he was found hypoxic with oxygen saturation of low 80s on room air. He was diagnosed with COVID-19 infection on . Patient was admitted for acute hypoxemic respiratory failure secondary to COVID-19 pneumonia. No fever no chills. Since 2022   Has been requiring 6 L/min. On Decadron and baricitinib  Chronic mild neck pain and dizziness improving. Developed mild hiccups , says also has hiccups at home but worse here. Started on baclofen and started on a very small dose of Thorazine 10 mg daily. On 2022, oxygen saturation 4 L/min at rest.  RT 6-minute showed that he became more dyspneic and hypoxic on movement and stopped. I have been updating patient's son regarding patient condition. Subjective (22):  2022  Complain of his chronic neck pain and dizziness, says has similar episodes at home which last for few hours. Says breathing better, was able to get up and go to restroom. Still requiring around 6 L/min    2022  Says chronic  dizziness better  Still requiring 6 lit/min  Spoke to son and update pts condition    2022  Complaining of hiccups  Shortness of breath on walking  Presently on 6 L/min    1/10/2022  Still having hiccups, was placed on baclofen yesterday  Complaining of mild shortness of breath on walking to restroom. Presently on 7 L/min  Spoke to son     2022  Patient says hiccups better on 10 mg of Thorazine  He is presently on 4 L/min oxygen nasal cannula.   Later on today RT did a 6-minute walk test, became hypoxic and short of breath, requiring more then 6 lit/min        Assessment & Plan:   Acute hypoxemic respiratory failure due to COVID-19 [facility]) (1/5/2022)    Chest x-ray on admission shows multifocal airspace opacities. Patient is currently needing 5-6 L oxygen nasal cannula to maintain saturations greater than 88%. Not vaccinated against COVID-19. Toni Tuckery tested positive for Covid on 12/22/21. Continue dexamethasone.  Procalcitonin is 0.15.    Continue Ceftriaxone, azithromycin, Baricitinib. D-dimer is 1.88  CRP is 12. Home oxygen screen prior to discharge. Ordered gentle fluids due to diarrhea. 1/9/2022  Presently requiring 6 L/min  We will order chest x-ray  1/10/2022  Initially on 7 L/min RT in room oxygen saturation 94-96, wean back to 6 L/min  Chest x-ray stable findings no worsening COVID-pneumonia. 1/11/2022  He is presently on 4 L/min oxygen nasal cannula. Later on today RT did a 6-minute walk test, became hypoxic and short of breath, requiring more then 6 lit/min      #Hiccups? Etiology  Will order baclofen. 1/10/2022  Added one-time small dose of 10 mg Thorazine  1/11/2022  Will continue Thorazine 10 mg daily    #Chronic neck pain with dizziness  Says has similar episode at home and last for few hours. Says was told that no other surgery for his neck. 1/11/2022-improving    #Elevated blood pressure  We will add amlodipine and lisinopril  Continue as needed hydralazine  1/11/2022  stable       Hypokalemia (1/5/2022)  Resolved     Mild hyponatremia  Resolved.     Thrombocytopenia mild  Platelet count of 127 on admission. This am, 126.   No signs of active bleeding. Likely from infection.  Monitor for now.  1/11/2022  resolved     Mild transaminitis:  No abdominal pain.   Likely from [address].       Dispo/Discharge Planning:    Depending on the oxygen requirements     Diet:  ADULT DIET Regular; Low Fat/Low Chol/High Fiber/2 gm Na  DVT PPx: Lovenox  Code status: Full Code      Hospital Problems as of 1/11/2022 Never Reviewed          Codes Class Noted - Resolved POA    HTN (hypertension) ICD-10-CM: I10  ICD-9-CM: 401.9  1/7/2022 - Present Unknown        * (Principal) Acute hypoxemic respiratory failure due to COVID-19 [facility]) ICD-10-CM: U07.1, J96.01  ICD-9-CM: 518.81, 079.89, 799.02  1/5/2022 - Present Unknown        Hypokalemia ICD-10-CM: E87.6  ICD-9-CM: 276.8  1/5/2022 - Present Unknown        Hyponatremia ICD-10-CM: E87.1  ICD-9-CM: 276.1  1/5/2022 - Present Unknown        Thrombocytopenia ([address] ICD-10-CM: D69.6  ICD-9-CM: 287.5  1/5/2022 - Present Unknown              Objective:     Patient Vitals for the past 24 hrs:   Temp Pulse Resp BP SpO2   01/11/22 0820 97.3 °F (36.3 °C) 79 16 134/76 95 %   01/11/22 0357 97.6 °F (36.4 °C) 73  120/75 96 %   01/10/22 2250 97.4 °F (36.3 °C) 77  116/71 98 %   01/10/22 2020 97.6 °F (36.4 °C) 94  127/78 96 %   01/10/22 1703 97.5 °F (36.4 °C) 79 20 127/80 97 %     Oxygen Therapy  O2 Sat (%): 95 % (01/11/22 0820)  Pulse via Oximetry: 84 beats per minute (01/10/22 1348)  O2 Device: Nasal cannula (01/11/22 1433)  O2 Flow Rate (L/min): 4 l/min (01/11/22 1433)    Estimated body mass index is 38.74 kg/m² as calculated from the following:    Height as of this encounter: 5' 6\" (1.676 m). Weight as of this encounter: 108.9 kg (240 lb). No intake or output data in the 24 hours ending 01/11/22 1520      Physical Exam:     Blood pressure 134/76, pulse 79, temperature 97.3 °F (36.3 °C), resp. rate 16, height 5' 6\" (1.676 m), weight 108.9 kg (240 lb), SpO2 95 %. General:    Well nourished. Mild respiratory distress, sitting in chair, on 4 lit/min. Improved hiccups  head:  Normocephalic, atraumatic  Eyes:  Sclerae appear normal.  Pupils equally round. ENT:  Nares appear normal, no drainage. Moist oral mucosa  Neck:  No restricted ROM. Trachea midline   CV:   RRR. No m/r/g. No jugular venous distension.   Lungs:   Bilateral coarse breath sounds  Abdomen: Bowel sounds present. Soft, nontender, nondistended. Extremities: No cyanosis or clubbing. No edema  Skin:     No rashes and normal coloration. Warm and dry. Neuro:  CN II-XII grossly intact. Sensation intact. A&Ox3  Psych:  Normal mood and affect. I have reviewed ordered lab tests and independently visualized imaging below:    Recent Labs:  Recent Results (from the past 48 hour(s))   METABOLIC PANEL, COMPREHENSIVE    Collection Time: 01/10/22  5:59 AM   Result Value Ref Range    Sodium 136 (L) 138 - 145 mmol/L    Potassium 4.5 3.5 - 5.1 mmol/L    Chloride 104 98 - 107 mmol/L    CO2 25 21 - 32 mmol/L    Anion gap 7 7 - 16 mmol/L    Glucose 153 (H) 65 - 100 mg/dL    BUN 26 (H) 8 - 23 MG/DL    Creatinine 0.99 0.8 - 1.5 MG/DL    GFR est AA >60 >60 ml/min/1.73m2    GFR est non-AA >60 >60 ml/min/1.73m2    Calcium 8.8 8.3 - 10.4 MG/DL    Bilirubin, total 0.6 0.2 - 1.1 MG/DL    ALT (SGPT) 89 (H) 12 - 65 U/L    AST (SGOT) 52 (H) 15 - 37 U/L    Alk. phosphatase 56 50 - 136 U/L    Protein, total 7.4 6.3 - 8.2 g/dL    Albumin 2.7 (L) 3.2 - 4.6 g/dL    Globulin 4.7 (H) 2.3 - 3.5 g/dL    A-G Ratio 0.6 (L) 1.2 - 3.5         All Micro Results     Procedure Component Value Units Date/Time    COVID-19 RAPID TEST [[id number]]  (Abnormal) Collected: 01/05/22 1128    Order Status: Completed Specimen: Nasopharyngeal Updated: 01/05/22 1204     Specimen source NASAL        Comment: RAPID ONLY        COVID-19 rapid test Detected        Comment:      The specimen is POSITIVE for SARS-CoV-2, the novel coronavirus associated with COVID-19. This test has been authorized by the FDA under an Emergency Use Authorization (EUA) for use by authorized laboratories.         Fact sheet for Healthcare Providers: ConventionUpdate.co.nz  Fact sheet for Patients: ConventionUpdate.co.nz       Methodology: Isothermal Nucleic Acid Amplification               Other Studies:  No results found.     Current Meds:  Current Facility-Administered Medications   Medication Dose Route Frequency    guaiFENesin ER (MUCINEX) tablet 600 mg  600 mg Oral Q12H    pantoprazole (PROTONIX) tablet 40 mg  40 mg Oral ACB    baclofen (LIORESAL) tablet 5 mg  5 mg Oral BID    sodium chloride (OCEAN) 0.65 % nasal squeeze bottle 2 Spray  2 Spray Both Nostrils Q2H PRN    amLODIPine (NORVASC) tablet 10 mg  10 mg Oral DAILY    lisinopriL (PRINIVIL, ZESTRIL) tablet 10 mg  10 mg Oral DAILY    Saccharomyces boulardii (FLORASTOR) capsule 500 mg  500 mg Oral BID    hydrALAZINE (APRESOLINE) 20 mg/mL injection 10 mg  10 mg IntraVENous Q6H PRN    sodium chloride (NS) flush 5-40 mL  5-40 mL IntraVENous Q8H    sodium chloride (NS) flush 5-40 mL  5-40 mL IntraVENous PRN    acetaminophen (TYLENOL) tablet 650 mg  650 mg Oral Q6H PRN    Or    acetaminophen (TYLENOL) suppository 650 mg  650 mg Rectal Q6H PRN    polyethylene glycol (MIRALAX) packet 17 g  17 g Oral DAILY PRN    ondansetron (ZOFRAN ODT) tablet 4 mg  4 mg Oral Q8H PRN    Or    ondansetron (ZOFRAN) injection 4 mg  4 mg IntraVENous Q6H PRN    guaiFENesin-dextromethorphan (ROBITUSSIN DM) 100-10 mg/5 mL syrup 5 mL  5 mL Oral Q4H PRN    dexAMETHasone (DECADRON) tablet 6 mg  6 mg Oral DAILY    baricitinib (OLUMIANT) tablet 4 mg  4 mg Oral DAILY    enoxaparin (LOVENOX) injection 40 mg  40 mg SubCUTAneous Q24H    influenza vaccine 2021-22 (6 mos+)(PF) (FLUARIX/FLULAVAL/FLUZONE QUAD) injection 0.5 mL  1 Each IntraMUSCular PRIOR TO DISCHARGE       Signed:  Rick Louie MD

## 2022-01-11 NOTE — PROGRESS NOTES
Evaluation for Home Oxygen    Resting (Room Air)  SpO2: 89  HR: 74    During Walk (Room Air)  SpO2: 86  HR: 78  Walk/Assistance Device: Ambulation  Rate of Dyspnea:  Increased RR 20-27  Symptoms:    Comments: SpO2 1L 86%, 2L 90%    During Walk (On O2)  SpO2: 84  HR: 74  O2 Device: Nasal cannula  O2 Flow Rate (L/min): 2 l/min  O2 Flow Rate: 3 l/min  O2 Saturation: 85  O2 Flow Rate: 4 l/min  O2 Saturation: 87  O2 Flow Rate: 5 l/min  O2 Saturation: 87  O2 Flow Rate:    O2 Saturation:    O2 Flow Rate:    O2 Saturation:    Walk/Assistance Device: Ambulation  Rate of Dyspnea: Dyspnea on exertion or irregular pattern  Symptoms: Fatigue,Shortness of breath  Comments: pt took a few minutes to recover, decreased to 3L after recovery    After Walk  SpO2: 91  HR: 69  O2 Device: Nasal cannula  O2 Flow Rate (L/min): 3 l/min  Does the Patient Qualify for Home O2:  yes  Liter Flow at Rest:  1  Liter Flow on Exertion:  5  Does the Patient Need Portable Oxygen Tanks:  yes          Electronically signed by RT Barbara on 1/11/2022 at 10:48 AM

## 2022-01-11 NOTE — PROGRESS NOTES
's follow-up with staff to explore patient needs. Mr. Shayla Holcomb is expected to discharge today. No needs were voiced.     Randi Flores 68  Board Certified  Left lower lumbar lipomatous mass corticosteroid injection. DESCRIPTION: This 12-year-old male was initially seen on October 2017 for nonradicular left-sided low back pain. He states that the pain seems to be associated with a lipomatous mass.   Because

## 2022-01-12 VITALS
BODY MASS INDEX: 38.57 KG/M2 | SYSTOLIC BLOOD PRESSURE: 132 MMHG | RESPIRATION RATE: 18 BRPM | OXYGEN SATURATION: 96 % | DIASTOLIC BLOOD PRESSURE: 75 MMHG | HEART RATE: 78 BPM | TEMPERATURE: 97.3 F | WEIGHT: 240 LBS | HEIGHT: 66 IN

## 2022-01-12 LAB
ALBUMIN SERPL-MCNC: 2.6 G/DL (ref 3.2–4.6)
ALBUMIN/GLOB SERPL: 0.6 {RATIO} (ref 1.2–3.5)
ALP SERPL-CCNC: 56 U/L (ref 50–136)
ALT SERPL-CCNC: 81 U/L (ref 12–65)
ANION GAP SERPL CALC-SCNC: 6 MMOL/L (ref 7–16)
AST SERPL-CCNC: 31 U/L (ref 15–37)
BASOPHILS # BLD: 0 K/UL (ref 0–0.2)
BASOPHILS NFR BLD: 0 % (ref 0–2)
BILIRUB SERPL-MCNC: 0.7 MG/DL (ref 0.2–1.1)
BUN SERPL-MCNC: 27 MG/DL (ref 8–23)
CALCIUM SERPL-MCNC: 9 MG/DL (ref 8.3–10.4)
CHLORIDE SERPL-SCNC: 102 MMOL/L (ref 98–107)
CO2 SERPL-SCNC: 27 MMOL/L (ref 21–32)
CREAT SERPL-MCNC: 0.88 MG/DL (ref 0.8–1.5)
DIFFERENTIAL METHOD BLD: ABNORMAL
EOSINOPHIL # BLD: 0 K/UL (ref 0–0.8)
EOSINOPHIL NFR BLD: 0 % (ref 0.5–7.8)
ERYTHROCYTE [DISTWIDTH] IN BLOOD BY AUTOMATED COUNT: 11.9 % (ref 11.9–14.6)
GLOBULIN SER CALC-MCNC: 4.2 G/DL (ref 2.3–3.5)
GLUCOSE SERPL-MCNC: 121 MG/DL (ref 65–100)
HCT VFR BLD AUTO: 49 % (ref 41.1–50.3)
HGB BLD-MCNC: 16.6 G/DL (ref 13.6–17.2)
IMM GRANULOCYTES # BLD AUTO: 0.2 K/UL (ref 0–0.5)
IMM GRANULOCYTES NFR BLD AUTO: 2 % (ref 0–5)
LYMPHOCYTES # BLD: 1 K/UL (ref 0.5–4.6)
LYMPHOCYTES NFR BLD: 11 % (ref 13–44)
MCH RBC QN AUTO: 28.3 PG (ref 26.1–32.9)
MCHC RBC AUTO-ENTMCNC: 33.9 G/DL (ref 31.4–35)
MCV RBC AUTO: 83.6 FL (ref 79.6–97.8)
MONOCYTES # BLD: 1.3 K/UL (ref 0.1–1.3)
MONOCYTES NFR BLD: 13 % (ref 4–12)
NEUTS SEG # BLD: 7.1 K/UL (ref 1.7–8.2)
NEUTS SEG NFR BLD: 74 % (ref 43–78)
NRBC # BLD: 0 K/UL (ref 0–0.2)
PLATELET # BLD AUTO: 236 K/UL (ref 150–450)
PMV BLD AUTO: 11.2 FL (ref 9.4–12.3)
POTASSIUM SERPL-SCNC: 4.3 MMOL/L (ref 3.5–5.1)
PROT SERPL-MCNC: 6.8 G/DL (ref 6.3–8.2)
RBC # BLD AUTO: 5.86 M/UL (ref 4.23–5.6)
SODIUM SERPL-SCNC: 135 MMOL/L (ref 138–145)
WBC # BLD AUTO: 9.6 K/UL (ref 4.3–11.1)

## 2022-01-12 PROCEDURE — 74011000250 HC RX REV CODE- 250: Performed by: HOSPITALIST

## 2022-01-12 PROCEDURE — 85025 COMPLETE CBC W/AUTO DIFF WBC: CPT

## 2022-01-12 PROCEDURE — 97110 THERAPEUTIC EXERCISES: CPT

## 2022-01-12 PROCEDURE — 80053 COMPREHEN METABOLIC PANEL: CPT

## 2022-01-12 PROCEDURE — 74011250637 HC RX REV CODE- 250/637: Performed by: FAMILY MEDICINE

## 2022-01-12 PROCEDURE — 74011250636 HC RX REV CODE- 250/636: Performed by: HOSPITALIST

## 2022-01-12 PROCEDURE — 77010033678 HC OXYGEN DAILY

## 2022-01-12 PROCEDURE — 74011250637 HC RX REV CODE- 250/637: Performed by: HOSPITALIST

## 2022-01-12 PROCEDURE — 36415 COLL VENOUS BLD VENIPUNCTURE: CPT

## 2022-01-12 PROCEDURE — 94760 N-INVAS EAR/PLS OXIMETRY 1: CPT

## 2022-01-12 PROCEDURE — 97530 THERAPEUTIC ACTIVITIES: CPT

## 2022-01-12 RX ORDER — DEXAMETHASONE 6 MG/1
6 TABLET ORAL
Qty: 2 TABLET | Refills: 0 | Status: SHIPPED | OUTPATIENT
Start: 2022-01-13 | End: 2022-01-15

## 2022-01-12 RX ORDER — FAMOTIDINE 20 MG/1
20 TABLET, FILM COATED ORAL 2 TIMES DAILY
Qty: 60 TABLET | Refills: 0 | Status: SHIPPED | OUTPATIENT
Start: 2022-01-12

## 2022-01-12 RX ORDER — GUAIFENESIN/DEXTROMETHORPHAN 100-10MG/5
5 SYRUP ORAL
Qty: 118 ML | Refills: 0 | Status: SHIPPED | OUTPATIENT
Start: 2022-01-12 | End: 2022-01-22

## 2022-01-12 RX ORDER — BACLOFEN 5 MG/1
5 TABLET ORAL 2 TIMES DAILY
Qty: 60 TABLET | Refills: 0 | Status: SHIPPED | OUTPATIENT
Start: 2022-01-12

## 2022-01-12 RX ADMIN — Medication 500 MG: at 08:58

## 2022-01-12 RX ADMIN — SODIUM CHLORIDE, PRESERVATIVE FREE 5 ML: 5 INJECTION INTRAVENOUS at 11:42

## 2022-01-12 RX ADMIN — DEXAMETHASONE 6 MG: 4 TABLET ORAL at 09:01

## 2022-01-12 RX ADMIN — LISINOPRIL 10 MG: 5 TABLET ORAL at 09:00

## 2022-01-12 RX ADMIN — GUAIFENESIN 600 MG: 600 TABLET ORAL at 09:00

## 2022-01-12 RX ADMIN — ACETAMINOPHEN 650 MG: 325 TABLET ORAL at 10:04

## 2022-01-12 RX ADMIN — PANTOPRAZOLE SODIUM 40 MG: 40 TABLET, DELAYED RELEASE ORAL at 05:34

## 2022-01-12 RX ADMIN — BARICITINIB 4 MG: 2 TABLET, FILM COATED ORAL at 08:58

## 2022-01-12 RX ADMIN — BACLOFEN 5 MG: 10 TABLET ORAL at 09:00

## 2022-01-12 RX ADMIN — AMLODIPINE BESYLATE 10 MG: 10 TABLET ORAL at 09:00

## 2022-01-12 RX ADMIN — SODIUM CHLORIDE, PRESERVATIVE FREE 10 ML: 5 INJECTION INTRAVENOUS at 05:33

## 2022-01-12 RX ADMIN — ENOXAPARIN SODIUM 40 MG: 100 INJECTION SUBCUTANEOUS at 13:49

## 2022-01-12 NOTE — PROGRESS NOTES
Evaluation for Home Oxygen    Resting (Room Air)  SpO2: 89  HR: 72    During Walk (Room Air)  SpO2: 86  HR: 74  Walk/Assistance Device: Ambulation  Rate of Dyspnea: Regular pattern RR 12-20  Symptoms:    Comments: 1L 87%, 2L 90%    During Walk (On O2)  SpO2: 88  HR: 75  O2 Device: Nasal cannula  O2 Flow Rate (L/min): 2 l/min  O2 Flow Rate: 3 l/min  O2 Saturation: 90  Walk/Assistance Device: Ambulation    After Walk  SpO2: 91  HR: 75  O2 Device: Nasal cannula  O2 Flow Rate (L/min): 3 l/min  Does the Patient Qualify for Home O2: Yes  Liter Flow at Rest:    Liter Flow on Exertion:  3  Does the Patient Need Portable Oxygen Tanks:  Yes      Additional Comments:     Electronically signed by RT Rodriguez Asp on 1/12/2022 at 1:29 PM

## 2022-01-12 NOTE — PROGRESS NOTES
END OF SHIFT NOTE:    INTAKE/OUTPUT  No intake/output data recorded. Voiding: YES  Catheter: NO  Drain:              Flatus: Patient does have flatus present. Stool:  1 occurrences. Characteristics:  Stool Assessment  Stool Color: Brown  Stool Appearance: Watery  Stool Amount: Medium  Stool Source/Status: Rectum    Emesis: 0 occurrences. Characteristics:        VITAL SIGNS  Patient Vitals for the past 12 hrs:   Temp Pulse Resp BP SpO2   01/12/22 0351 97.4 °F (36.3 °C) 74 18 126/70 92 %   01/12/22 0025 97.4 °F (36.3 °C) 74 18 132/79 94 %   01/11/22 2053 97.7 °F (36.5 °C) 84 18 136/81 95 %       Pain Assessment  Pain Intensity 1: 0 (01/11/22 1925)  Pain Location 1: Neck  Pain Intervention(s) 1: Medication (see MAR) (Pt states he only takes Tylenol for the pain; nothing strong)  Patient Stated Pain Goal: 0    Ambulating  Yes    Shift report given to oncoming nurse at the bedside.     Nicole Rangel RN

## 2022-01-12 NOTE — PROGRESS NOTES
Problem: Airway Clearance - Ineffective  Goal: Achieve or maintain patent airway  Outcome: Resolved/Met     Problem: Gas Exchange - Impaired  Goal: Absence of hypoxia  Outcome: Resolved/Met  Goal: Promote optimal lung function  Outcome: Resolved/Met     Problem: Breathing Pattern - Ineffective  Goal: Ability to achieve and maintain a regular respiratory rate  Outcome: Resolved/Met     Problem: Body Temperature -  Risk of, Imbalanced  Goal: Ability to maintain a body temperature within defined limits  Outcome: Resolved/Met  Goal: Will regain or maintain usual level of consciousness  Outcome: Resolved/Met  Goal: Complications related to the disease process, condition or treatment will be avoided or minimized  Outcome: Resolved/Met     Problem: Isolation Precautions - Risk of Spread of Infection  Goal: Prevent transmission of infectious organism to others  Outcome: Resolved/Met     Problem: Nutrition Deficits  Goal: Optimize nutrtional status  Outcome: Resolved/Met     Problem: Risk for Fluid Volume Deficit  Goal: Maintain normal heart rhythm  Outcome: Resolved/Met  Goal: Maintain absence of muscle cramping  Outcome: Resolved/Met  Goal: Maintain normal serum potassium, sodium, calcium, phosphorus, and pH  Outcome: Resolved/Met     Problem: Loneliness or Risk for Loneliness  Goal: Demonstrate positive use of time alone when socialization is not possible  Outcome: Resolved/Met     Problem: Fatigue  Goal: Verbalize increase energy and improved vitality  Outcome: Resolved/Met     Problem: Patient Education: Go to Patient Education Activity  Goal: Patient/Family Education  Outcome: Resolved/Met     Problem: Falls - Risk of  Goal: *Absence of Falls  Description: Document Jaqueline Fall Risk and appropriate interventions in the flowsheet.   1/12/2022 1542 by Shahana Peterson  Outcome: Resolved/Met  Note: Fall Risk Interventions:  Mobility Interventions: Communicate number of staff needed for ambulation/transfer,Patient to call before getting OOB,PT Consult for mobility concerns,Strengthening exercises (ROM-active/passive)         Medication Interventions: Assess postural VS orthostatic hypotension,Evaluate medications/consider consulting pharmacy,Patient to call before getting OOB,Teach patient to arise slowly    Elimination Interventions: Call light in reach,Patient to call for help with toileting needs           1/12/2022 1101 by Nima Evans  Outcome: Progressing Towards Goal  Note: Fall Risk Interventions:  Mobility Interventions: Communicate number of staff needed for ambulation/transfer,Patient to call before getting OOB,PT Consult for mobility concerns,Strengthening exercises (ROM-active/passive)         Medication Interventions: Assess postural VS orthostatic hypotension,Evaluate medications/consider consulting pharmacy,Patient to call before getting OOB,Teach patient to arise slowly    Elimination Interventions: Call light in reach,Patient to call for help with toileting needs              Problem: Patient Education: Go to Patient Education Activity  Goal: Patient/Family Education  Outcome: Resolved/Met     Problem: Patient Education: Go to Patient Education Activity  Goal: Patient/Family Education  Outcome: Resolved/Met     Problem: Patient Education: Go to Patient Education Activity  Goal: Patient/Family Education  Outcome: Resolved/Met

## 2022-01-12 NOTE — PROGRESS NOTES
Problem: Airway Clearance - Ineffective  Goal: Achieve or maintain patent airway  Outcome: Progressing Towards Goal     Problem: Gas Exchange - Impaired  Goal: Absence of hypoxia  Outcome: Progressing Towards Goal  Goal: Promote optimal lung function  Outcome: Progressing Towards Goal     Problem: Breathing Pattern - Ineffective  Goal: Ability to achieve and maintain a regular respiratory rate  Outcome: Progressing Towards Goal     Problem: Body Temperature -  Risk of, Imbalanced  Goal: Ability to maintain a body temperature within defined limits  Outcome: Progressing Towards Goal  Goal: Will regain or maintain usual level of consciousness  Outcome: Progressing Towards Goal  Goal: Complications related to the disease process, condition or treatment will be avoided or minimized  Outcome: Progressing Towards Goal     Problem: Isolation Precautions - Risk of Spread of Infection  Goal: Prevent transmission of infectious organism to others  Outcome: Progressing Towards Goal     Problem: Nutrition Deficits  Goal: Optimize nutrtional status  Outcome: Progressing Towards Goal     Problem: Risk for Fluid Volume Deficit  Goal: Maintain normal heart rhythm  Outcome: Progressing Towards Goal  Goal: Maintain absence of muscle cramping  Outcome: Progressing Towards Goal  Goal: Maintain normal serum potassium, sodium, calcium, phosphorus, and pH  Outcome: Progressing Towards Goal     Problem: Loneliness or Risk for Loneliness  Goal: Demonstrate positive use of time alone when socialization is not possible  Outcome: Progressing Towards Goal     Problem: Fatigue  Goal: Verbalize increase energy and improved vitality  Outcome: Progressing Towards Goal     Problem: Patient Education: Go to Patient Education Activity  Goal: Patient/Family Education  Outcome: Progressing Towards Goal     Problem: Falls - Risk of  Goal: *Absence of Falls  Description: Document Jaqueline Fall Risk and appropriate interventions in the flowsheet.   Outcome: Progressing Towards Goal  Note: Fall Risk Interventions:  Mobility Interventions: Patient to call before getting OOB         Medication Interventions: Teach patient to arise slowly    Elimination Interventions: Call light in reach              Problem: Patient Education: Go to Patient Education Activity  Goal: Patient/Family Education  Outcome: Progressing Towards Goal     Problem: Patient Education: Go to Patient Education Activity  Goal: Patient/Family Education  Outcome: Progressing Towards Goal     Problem: Patient Education: Go to Patient Education Activity  Goal: Patient/Family Education  Outcome: Progressing Towards Goal

## 2022-01-12 NOTE — ROUTINE PROCESS
Bedside and Verbal shift change report received from offgoing RN. Report included the following information SBAR, Kardex, Intake/Output, MAR and Recent Results.

## 2022-01-12 NOTE — PROGRESS NOTES
Discharge instructions printed and reviewed with patient. Prescriptions given for new medications and med info sheets provided for all new medications. Medications sent to patient's pharmacy. Opportunity for questions provided. Patient voiced understanding of all discharge instructions. IV removed. Patient belongings packed and with patient. Patient connected to portable oxygen tank. Patient states there is no needs at this time. *E-sign not working.

## 2022-01-12 NOTE — PROGRESS NOTES
ACUTE PHYSICAL THERAPY GOALS:  (Developed with and agreed upon by patient and/or caregiver.)  (1.)Mr. Michelle will move from supine to sit and sit to supine  in bed with INDEPENDENCE within 7 treatment day(s). (2.)Mr. Michelle will transfer from bed to chair and chair to bed with INDEPENDENCE using the least restrictive device within 7 treatment day(s). (3.)Mr. Michelle will ambulate with INDEPENDENCE for 400 feet with the least restrictive device within 7 treatment day(s). (4.)Mr. Michelle will perform standing static and dynamic balance activities x 23 minutes with SUPERVISION to improve safety within 7 treatment day(s). (5.)Mr. Michelle will ambulate and/or perform functional activities for  23 consecutive minutes with stable vital signs and no rests required to improve activity tolerance within 7 treatment days. PHYSICAL THERAPY: Daily Note and AM Treatment Day # 3    Yu Jordan is a 68 y.o. male   PRIMARY DIAGNOSIS: Acute hypoxemic respiratory failure due to COVID-19 [facility])  Acute hypoxemic respiratory failure due to COVID-19 ([facility] [U07.1, J96.01]         ASSESSMENT:     REHAB RECOMMENDATIONS: CURRENT LEVEL OF FUNCTION:  (Most Recently Demonstrated)   Recommendation to date pending progress:  Setting:   No further skilled therapy   Equipment:    To Be Determined Bed Mobility:   Not tested  Sit to Stand:   Supervision  Transfers:   Supervision  Gait/Mobility:   Supervision     ASSESSMENT:  Mr. Ulisses Davis was sitting up in chair. He stood with S and ambulated 48' plus additional 20' with no AD and supervision. Pt is steady on his feet and states he walks multiple times during the day. BLE exercises performed standing and sitting. 3L O2 during treatment with sats in mid 90s. Pt left sitting up in chair with needs in reach. SUBJECTIVE:   Mr. Ulisses Davis states \"I feel pretty good\"    SOCIAL HISTORY/ LIVING ENVIRONMENT: lives with wife in 1 story home, 1/2 step to enter. PLOF indep with ADLs, mobility.  No DME.  Home Environment: Private residence  # Steps to Enter: 1  One/Two Story Residence: One story  Living Alone: No  Support Systems: Spouse/Significant Other,Other Family Member(s),Child(joy)  OBJECTIVE:     PAIN: VITAL SIGNS: LINES/DRAINS:   Pre Treatment:  0  Post Treatment: 0   none  O2 Device: Nasal cannula     MOBILITY: I Mod I S SBA CGA Min Mod Max Total  NT x2 Comments:   Bed Mobility    Rolling [] [] [] [] [] [] [] [] [] [x] []    Supine to Sit [] [] [] [] [] [] [] [] [] [x] []    Scooting [] [] [] [] [] [] [] [] [] [x] []    Sit to Supine [] [] [] [] [] [] [] [] [] [x] []    Transfers    Sit to Stand [] [] [x] [] [] [] [] [] [] [] []    Bed to Chair [] [] [x] [] [] [] [] [] [] [] []    Stand to Sit [] [] [x] [] [] [] [] [] [] [] []    I=Independent, Mod I=Modified Independent, S=Supervision, SBA=Standby Assistance, CGA=Contact Guard Assistance,   Min=Minimal Assistance, Mod=Moderate Assistance, Max=Maximal Assistance, Total=Total Assistance, NT=Not Tested    BALANCE: Good Fair+ Fair Fair- Poor NT Comments   Sitting Static [x] [] [] [] [] []    Sitting Dynamic [] [] [] [] [] []              Standing Static [] [x] [] [] [] []    Standing Dynamic [] [x] [] [] [] []      GAIT: I Mod I S SBA CGA Min Mod Max Total  NT x2 Comments:   Level of Assistance [] [] [x] [] [] [] [] [] [] [] []    Distance 50' plus additional 30'     DME None    Gait Quality Good     Weightbearing  Status N/A     I=Independent, Mod I=Modified Independent, S=Supervision, SBA=Standby Assistance, CGA=Contact Guard Assistance,   Min=Minimal Assistance, Mod=Moderate Assistance, Max=Maximal Assistance, Total=Total Assistance, NT=Not Tested    PLAN:   FREQUENCY/DURATION: PT Plan of Care: 2 times/week for duration of hospital stay or until stated goals are met, whichever comes first.  TREATMENT:     TREATMENT:   ($$ Therapeutic Activity: 8-22 mins  $$ Therapeutic Exercises: 8-22 mins    )  Therapeutic Activity (15 Minutes):  Therapeutic activity included Scooting, Transfer Training, Ambulation on level ground, Sitting balance  and Standing balance to improve functional Mobility, Strength and Activity tolerance. Therapeutic Exercise (10 Minutes): Therapeutic exercises noted below to improve functional activity tolerance, AROM and mobility.      TREATMENT GRID:   Date:  1/12/21 Date:   Date:     Activity/Exercise Parameters Parameters Parameters   Heel/toe raises - standing  10 B     LAQs 10 B     marching 10 B     Hip ABD/ADD 10 B                         AFTER TREATMENT POSITION/PRECAUTIONS:  Chair    INTERDISCIPLINARY COLLABORATION:  RN/PCT    TOTAL TREATMENT DURATION:  PT Patient Time In/Time Out  Time In: 1125  Time Out: [address], PTA

## 2022-01-12 NOTE — PROGRESS NOTES
Problem: Falls - Risk of  Goal: *Absence of Falls  Description: Document Cherylle Cockayne Fall Risk and appropriate interventions in the flowsheet.   Outcome: Progressing Towards Goal  Note: Fall Risk Interventions:  Mobility Interventions: Communicate number of staff needed for ambulation/transfer,Patient to call before getting OOB,PT Consult for mobility concerns,Strengthening exercises (ROM-active/passive)         Medication Interventions: Assess postural VS orthostatic hypotension,Evaluate medications/consider consulting pharmacy,Patient to call before getting OOB,Teach patient to arise slowly    Elimination Interventions: Call light in reach,Patient to call for help with toileting needs

## 2022-01-12 NOTE — DISCHARGE SUMMARY
Hospitalist Discharge Summary   Admit Date:  2022 10:11 AM   DC Note date: 2022  Name:  Sunitha Delaney   Age:  68 y.o. Sex:  male  :  1945   MRN:  [id number]   Room:  [facility]  PCP:  None    Presenting Complaint: Positive For Covid-19    Initial Admission Diagnosis: Acute hypoxemic respiratory failure due to COVID-19 ([facility] [U07.1, J96.01]     Problem List for this Hospitalization:  Hospital Problems as of 2022 Never Reviewed          Codes Class Noted - Resolved POA    HTN (hypertension) ICD-10-CM: I10  ICD-9-CM: 401.9  2022 - Present Unknown        * (Principal) Acute hypoxemic respiratory failure due to COVID-19 [facility]) ICD-10-CM: U07.1, J96.01  ICD-9-CM: 518.81, 079.89, 799.02  2022 - Present Unknown        Hypokalemia ICD-10-CM: E87.6  ICD-9-CM: 276.8  2022 - Present Unknown        Hyponatremia ICD-10-CM: E87.1  ICD-9-CM: 276.1  2022 - Present Unknown        Thrombocytopenia ([facility] ICD-10-CM: D69.6  ICD-9-CM: 287.5  2022 - Present Unknown            Did Patient have Sepsis (YES OR NO): no    Hospital Course:  54-year-old male with obesity and unvaccinated for covid, has no PCP presented to ER from emergency MD via EMS as he was found hypoxic with oxygen saturation of low 80s on room air. Bella Hawthorne was diagnosed with COVID-19 infection on .  Patient was admitted for acute hypoxemic respiratory failure. He was treated with usual care including decadron, was Weaned to 4L at rst and ambulation converned on RT oximetry w/ ambulation. He was treated empirically for CAP. He suffered hiccups and started on baclofen. He was started on amlodipine and lisinopril due to elevated BP and devloped Dizziness with standing, DC'd BP medications that were started during admission. Advised to FU with PCP (establish care) to FU BP. He is to complete 10 days of decadron total. High risk for XI. Split sleepy study ordered. Disposition: Home or Self Care  Diet: ADULT DIET Regular;  Low Fat/Low Chol/High Fiber/2 gm Na  Code Status: Full Code    Follow Up Orders: Follow-up Appointments   Procedures    FOLLOW UP VISIT Appointment in: One Week Follow up with Primary Care Provider in 1 week     Follow up with Primary Care Provider in 1 week     Standing Status:   Standing     Number of Occurrences:   1     Standing Expiration Date:   1/13/2022     Order Specific Question:   Appointment in     Answer: One Week       Follow-up Information     Follow up With Specialties Details Why Contact Info    None    None ([phone]) Patient stated that they have no PCP            Follow up labs/diagnostics (ultimately defer to outpatient provider):  CMP, CBC, sleep study, BP     Time spent in patient discharge and coordination 39 minutes. Plan was discussed with patient. All questions answered. Patient was stable at time of discharge. Instructions given to call a physician or return if any concerns. Discharge Info:   Current Discharge Medication List      START taking these medications    Details   baclofen 5 mg tab Take 5 mg by mouth two (2) times a day. Qty: 60 Tablet, Refills: 0  Start date: 1/12/2022      dexAMETHasone (DECADRON) 6 mg tablet Take 1 Tablet by mouth Daily (before breakfast) for 2 days. Qty: 2 Tablet, Refills: 0  Start date: 1/13/2022, End date: 1/15/2022      sodium chloride (OCEAN) 0.65 % nasal squeeze bottle 0.1 mL by Both Nostrils route every two (2) hours as needed for Congestion or Nasal Dryness. Qty: 104 mL, Refills: 0  Start date: 1/12/2022      famotidine (PEPCID) 20 mg tablet Take 1 Tablet by mouth two (2) times a day. Qty: 60 Tablet, Refills: 0  Start date: 1/12/2022      guaiFENesin-dextromethorphan (ROBITUSSIN DM) 100-10 mg/5 mL syrup Take 5 mL by mouth every four (4) hours as needed for Cough for up to 10 days.   Qty: 118 mL, Refills: 0  Start date: 1/12/2022, End date: 1/22/2022             Procedures done this admission:  * No surgery found *    Consults this admission:  None    Echocardiogram/EKG results:  No results found for this or any previous visit. EKG Results     None          Diagnostic Imaging/Tests:   XR CHEST PORT    Result Date: 1/5/2022  EXAM: XR CHEST PORT INDICATION: shob COMPARISON: Chest radiograph, 1/5/2022 FINDINGS: The cardiomediastinal silhouette is enlarged, likely accentuated by technique. There are peripheral and bibasilar predominant airspace opacities. No pleural effusion. No pneumothorax. No acute osseous abnormality. Multifocal airspace opacities worrisome for a viral pneumonia. All Micro Results     Procedure Component Value Units Date/Time    COVID-19 RAPID TEST [[id number]]  (Abnormal) Collected: 01/05/22 1128    Order Status: Completed Specimen: Nasopharyngeal Updated: 01/05/22 1204     Specimen source NASAL        Comment: RAPID ONLY        COVID-19 rapid test Detected        Comment:      The specimen is POSITIVE for SARS-CoV-2, the novel coronavirus associated with COVID-19. This test has been authorized by the FDA under an Emergency Use Authorization (EUA) for use by authorized laboratories.         Fact sheet for Healthcare Providers: ConventionUpdate.co.nz  Fact sheet for Patients: ConventionUpdate.co.nz       Methodology: Isothermal Nucleic Acid Amplification               Labs: Results:       BMP, Mg, Phos Recent Labs     01/12/22  0834 01/10/22  0559   * 136*   K 4.3 4.5    104   CO2 27 25   AGAP 6* 7   BUN 27* 26*   CREA 0.88 0.99   CA 9.0 8.8   * 153*      CBC Recent Labs     01/12/22  0834   WBC 9.6   RBC 5.86*   HGB 16.6   HCT 49.0      GRANS 74   LYMPH 11*   EOS 0*   MONOS 13*   BASOS 0   IG 2   ANEU 7.1   ABL 1.0   YUNG 0.0   ABM 1.3   ABB 0.0   AIG 0.2      LFT Recent Labs     01/12/22  0834 01/10/22  0559   ALT 81* 89*   AP 56 56   TP 6.8 7.4   ALB 2.6* 2.7*   GLOB 4.2* 4.7*   AGRAT 0.6* 0.6*      Cardiac Testing No results found for: BNPP, BNP, CPK, RCK1, RCK2, RCK3, RCK4, CKMB, CKNDX, CKND1, TROPT, TROIQ   Coagulation Tests No results found for: PTP, INR, APTT, INREXT   A1c No results found for: HBA1C, [id number]   Lipid Panel No results found for: CHOL, CHOLPOCT, CHOLX, CHLST, CHOLV, 756990, HDL, HDLP, LDL, LDLC, DLDLP, 250926, VLDLC, VLDL, TGLX, TRIGL, TRIGP, TGLPOCT, CHHD, CHHDX   Thyroid Panel No results found for: TSH, T4, FT4, TT3, T3U, TSHEXT     Most Recent UA No results found for: COLOR, APPRN, REFSG, HERMINIO, PROTU, GLUCU, KETU, BILU, BLDU, UROU, ROSALINDA, LEUKU, WBCU, RBCU, UEPI, BACTU, CASTS, UCRY, MUCUS, UCOM       All Labs from Last 24 Hrs:  Recent Results (from the past 24 hour(s))   CBC WITH AUTOMATED DIFF    Collection Time: 01/12/22  8:34 AM   Result Value Ref Range    WBC 9.6 4.3 - 11.1 K/uL    RBC 5.86 (H) 4.23 - 5.6 M/uL    HGB 16.6 13.6 - 17.2 g/dL    HCT 49.0 41.1 - 50.3 %    MCV 83.6 79.6 - 97.8 FL    MCH 28.3 26.1 - 32.9 PG    MCHC 33.9 31.4 - 35.0 g/dL    RDW 11.9 11.9 - 14.6 %    PLATELET 327 988 - 593 K/uL    MPV 11.2 9.4 - 12.3 FL    ABSOLUTE NRBC 0.00 0.0 - 0.2 K/uL    DF AUTOMATED      NEUTROPHILS 74 43 - 78 %    LYMPHOCYTES 11 (L) 13 - 44 %    MONOCYTES 13 (H) 4.0 - 12.0 %    EOSINOPHILS 0 (L) 0.5 - 7.8 %    BASOPHILS 0 0.0 - 2.0 %    IMMATURE GRANULOCYTES 2 0.0 - 5.0 %    ABS. NEUTROPHILS 7.1 1.7 - 8.2 K/UL    ABS. LYMPHOCYTES 1.0 0.5 - 4.6 K/UL    ABS. MONOCYTES 1.3 0.1 - 1.3 K/UL    ABS. EOSINOPHILS 0.0 0.0 - 0.8 K/UL    ABS. BASOPHILS 0.0 0.0 - 0.2 K/UL    ABS. IMM.  GRANS. 0.2 0.0 - 0.5 K/UL   METABOLIC PANEL, COMPREHENSIVE    Collection Time: 01/12/22  8:34 AM   Result Value Ref Range    Sodium 135 (L) 138 - 145 mmol/L    Potassium 4.3 3.5 - 5.1 mmol/L    Chloride 102 98 - 107 mmol/L    CO2 27 21 - 32 mmol/L    Anion gap 6 (L) 7 - 16 mmol/L    Glucose 121 (H) 65 - 100 mg/dL    BUN 27 (H) 8 - 23 MG/DL    Creatinine 0.88 0.8 - 1.5 MG/DL    GFR est AA >60 >60 ml/min/1.73m2    GFR est non-AA >60 >60 ml/min/1.73m2 Calcium 9.0 8.3 - 10.4 MG/DL    Bilirubin, total 0.7 0.2 - 1.1 MG/DL    ALT (SGPT) 81 (H) 12 - 65 U/L    AST (SGOT) 31 15 - 37 U/L    Alk.  phosphatase 56 50 - 136 U/L    Protein, total 6.8 6.3 - 8.2 g/dL    Albumin 2.6 (L) 3.2 - 4.6 g/dL    Globulin 4.2 (H) 2.3 - 3.5 g/dL    A-G Ratio 0.6 (L) 1.2 - 3.5         Current Med List in Hospital:   Current Facility-Administered Medications   Medication Dose Route Frequency    guaiFENesin ER (MUCINEX) tablet 600 mg  600 mg Oral Q12H    pantoprazole (PROTONIX) tablet 40 mg  40 mg Oral ACB    baclofen (LIORESAL) tablet 5 mg  5 mg Oral BID    sodium chloride (OCEAN) 0.65 % nasal squeeze bottle 2 Spray  2 Spray Both Nostrils Q2H PRN    amLODIPine (NORVASC) tablet 10 mg  10 mg Oral DAILY    lisinopriL (PRINIVIL, ZESTRIL) tablet 10 mg  10 mg Oral DAILY    Saccharomyces boulardii (FLORASTOR) capsule 500 mg  500 mg Oral BID    hydrALAZINE (APRESOLINE) 20 mg/mL injection 10 mg  10 mg IntraVENous Q6H PRN    sodium chloride (NS) flush 5-40 mL  5-40 mL IntraVENous Q8H    sodium chloride (NS) flush 5-40 mL  5-40 mL IntraVENous PRN    acetaminophen (TYLENOL) tablet 650 mg  650 mg Oral Q6H PRN    Or    acetaminophen (TYLENOL) suppository 650 mg  650 mg Rectal Q6H PRN    polyethylene glycol (MIRALAX) packet 17 g  17 g Oral DAILY PRN    ondansetron (ZOFRAN ODT) tablet 4 mg  4 mg Oral Q8H PRN    Or    ondansetron (ZOFRAN) injection 4 mg  4 mg IntraVENous Q6H PRN    guaiFENesin-dextromethorphan (ROBITUSSIN DM) 100-10 mg/5 mL syrup 5 mL  5 mL Oral Q4H PRN    dexAMETHasone (DECADRON) tablet 6 mg  6 mg Oral DAILY    baricitinib (OLUMIANT) tablet 4 mg  4 mg Oral DAILY    enoxaparin (LOVENOX) injection 40 mg  40 mg SubCUTAneous Q24H    influenza vaccine 2021-22 (6 mos+)(PF) (FLUARIX/FLULAVAL/FLUZONE QUAD) injection 0.5 mL  1 Each IntraMUSCular PRIOR TO DISCHARGE       Allergies   Allergen Reactions    Lipitor [Atorvastatin] Rash     There is no immunization history for the selected administration types on file for this patient. Recent Vital Data:  Patient Vitals for the past 24 hrs:   Temp Pulse Resp BP SpO2   01/12/22 1157 97.3 °F (36.3 °C) 78 18 132/75 96 %   01/12/22 0808 97.5 °F (36.4 °C) 74 18 131/83 96 %   01/12/22 0351 97.4 °F (36.3 °C) 74 18 126/70 92 %   01/12/22 0025 97.4 °F (36.3 °C) 74 18 132/79 94 %   01/11/22 2053 97.7 °F (36.5 °C) 84 18 136/81 95 %   01/11/22 1644 97.1 °F (36.2 °C) 76 18 129/79 97 %     Oxygen Therapy  O2 Sat (%): 96 % (01/12/22 1157)  Pulse via Oximetry: 84 beats per minute (01/10/22 1348)  O2 Device: Nasal cannula (01/12/22 1211)  Skin Assessment: Clean, dry, & intact (01/12/22 1211)  Skin Protection for O2 Device: N/A (01/12/22 1211)  O2 Flow Rate (L/min): 4 l/min (01/12/22 1211)    Estimated body mass index is 38.74 kg/m² as calculated from the following:    Height as of this encounter: 5' 6\" (1.676 m). Weight as of this encounter: 108.9 kg (240 lb). Intake/Output Summary (Last 24 hours) at 1/12/2022 1530  Last data filed at 1/12/2022 1211  Gross per 24 hour   Intake 240 ml   Output    Net 240 ml         Physical Exam:  Physical Exam  Vitals and nursing note reviewed. Constitutional:       Appearance: Normal appearance. He is obese. HENT:      Head: Normocephalic and atraumatic. Mouth/Throat:      Mouth: Mucous membranes are moist.   Eyes:      Extraocular Movements: Extraocular movements intact. Conjunctiva/sclera: Conjunctivae normal.      Pupils: Pupils are equal, round, and reactive to light. Cardiovascular:      Rate and Rhythm: Normal rate and regular rhythm. Pulmonary:      Effort: Pulmonary effort is normal.   Abdominal:      General: Abdomen is flat. Musculoskeletal:      Cervical back: Neck supple. Right lower leg: Edema present. Left lower leg: Edema present. Neurological:      Mental Status: He is alert and oriented to person, place, and time.    Psychiatric:         Mood and Affect: Mood normal. Signed:  Erika Bentley DO    Part of this note may have been written by using a voice dictation software. The note has been proof read but may still contain some grammatical/other typographical errors.

## 2022-01-12 NOTE — PROGRESS NOTES
Pt is for discharge home today with  Home Oxygen  Home 02: [facility]  No further needs/supportive care orders received for CM at this time. Pt will have close follow up with PCP    Milestones Met    Care Management Interventions  PCP Verified by CM: Yes  Mode of Transport at Discharge:  Other (see comment)  Transition of Care Consult (CM Consult): Discharge Planning  Discharge Durable Medical Equipment: Yes  Health Maintenance Reviewed:  (home 02 with [facility])  Physical Therapy Consult: Yes  Occupational Therapy Consult: Yes  Speech Therapy Consult: No  Support Systems: Spouse/Significant Other,Other Family Member(s),Child(joy)  Confirm Follow Up Transport: Family  The Plan for Transition of Care is Related to the Following Treatment Goals : return to baseline  The Patient and/or Patient Representative was Provided with a Choice of Provider and Agrees with the Discharge Plan?: Yes  Name of the Patient Representative Who was Provided with a Choice of Provider and Agrees with the Discharge Plan: patient  Freedom of Choice List was Provided with Basic Dialogue that Supports the Patient's Individualized Plan of Care/Goals, Treatment Preferences and Shares the Quality Data Associated with the Providers?: Yes  Superior Resource Information Provided?: No  Discharge Location  Discharge Placement: Home with family assistance (Home 02 with [facility])

## 2022-01-12 NOTE — DISCHARGE INSTRUCTIONS
Patient Education   Patient Education        High Blood Pressure: Care Instructions  Overview     It's normal for blood pressure to go up and down throughout the day. But if it stays up, you have high blood pressure. Another name for high blood pressure is hypertension. Despite what a lot of people think, high blood pressure usually doesn't cause headaches or make you feel dizzy or lightheaded. It usually has no symptoms. But it does increase your risk of stroke, heart attack, and other problems. You and your doctor will talk about your risks of these problems based on your blood pressure. Your doctor will give you a goal for your blood pressure. Your goal will be based on your health and your age. Lifestyle changes, such as eating healthy and being active, are always important to help lower blood pressure. You might also take medicine to reach your blood pressure goal.  Follow-up care is a key part of your treatment and safety. Be sure to make and go to all appointments, and call your doctor if you are having problems. It's also a good idea to know your test results and keep a list of the medicines you take. How can you care for yourself at home? Medical treatment  · If you stop taking your medicine, your blood pressure will go back up. You may take one or more types of medicine to lower your blood pressure. Be safe with medicines. Take your medicine exactly as prescribed. Call your doctor if you think you are having a problem with your medicine. · Talk to your doctor before you start taking aspirin every day. Aspirin can help certain people lower their risk of a heart attack or stroke. But taking aspirin isn't right for everyone, because it can cause serious bleeding. · See your doctor regularly. You may need to see the doctor more often at first or until your blood pressure comes down.   · If you are taking blood pressure medicine, talk to your doctor before you take decongestants or anti-inflammatory medicine, such as ibuprofen. Some of these medicines can raise blood pressure. · Learn how to check your blood pressure at home. Lifestyle changes  · Stay at a healthy weight. This is especially important if you put on weight around the waist. Losing even 10 pounds can help you lower your blood pressure. · If your doctor recommends it, get more exercise. Walking is a good choice. Bit by bit, increase the amount you walk every day. Try for at least 30 minutes on most days of the week. You also may want to swim, bike, or do other activities. · Avoid or limit alcohol. Talk to your doctor about whether you can drink any alcohol. · Try to limit how much sodium you eat to less than 2,300 milligrams (mg) a day. Your doctor may ask you to try to eat less than 1,500 mg a day. · Eat plenty of fruits (such as bananas and oranges), vegetables, legumes, whole grains, and low-fat dairy products. · Lower the amount of saturated fat in your diet. Saturated fat is found in animal products such as milk, cheese, and meat. Limiting these foods may help you lose weight and also lower your risk for heart disease. · Do not smoke. Smoking increases your risk for heart attack and stroke. If you need help quitting, talk to your doctor about stop-smoking programs and medicines. These can increase your chances of quitting for good. When should you call for help? Call 911  anytime you think you may need emergency care. This may mean having symptoms that suggest that your blood pressure is causing a serious heart or blood vessel problem. Your blood pressure may be over 180/120. For example, call 911 if:    · You have symptoms of a heart attack. These may include:  ? Chest pain or pressure, or a strange feeling in the chest.  ? Sweating. ? Shortness of breath. ? Nausea or vomiting. ? Pain, pressure, or a strange feeling in the back, neck, jaw, or upper belly or in one or both shoulders or arms.   ? Lightheadedness or sudden weakness. ? A fast or irregular heartbeat.     · You have symptoms of a stroke. These may include:  ? Sudden numbness, tingling, weakness, or loss of movement in your face, arm, or leg, especially on only one side of your body. ? Sudden vision changes. ? Sudden trouble speaking. ? Sudden confusion or trouble understanding simple statements. ? Sudden problems with walking or balance. ? A sudden, severe headache that is different from past headaches.     · You have severe back or belly pain. Do not wait until your blood pressure comes down on its own. Get help right away. Call your doctor now or seek immediate care if:    · Your blood pressure is much higher than normal (such as 180/120 or higher), but you don't have symptoms.     · You think high blood pressure is causing symptoms, such as:  ? Severe headache.  ? Blurry vision. Watch closely for changes in your health, and be sure to contact your doctor if:    · Your blood pressure measures higher than your doctor recommends at least 2 times. That means the top number is higher or the bottom number is higher, or both.     · You think you may be having side effects from your blood pressure medicine. Where can you learn more? Go to http://www.gray.com/  Enter [id number] in the search box to learn more about \"High Blood Pressure: Care Instructions. \"  Current as of: April 29, 2021               Content Version: 13.0  © 5215-1129 Healthwise, [employer]. Care instructions adapted under license by [employer] (which disclaims liability or warranty for this information). If you have questions about a medical condition or this instruction, always ask your healthcare professional. [place] [address] any warranty or liability for your use of this information. Learning About COVID-19 and Flu Symptoms  How can you tell COVID-19 from the flu? COVID-19 and the flu have similar symptoms.  The two can be hard to tell apart. The only way to know for sure which illness you have is to be tested. Since the symptoms are so alike, it makes sense to act as if you have COVID-19 until your test results come back. This means staying home and limiting contact with people in your home. You'll need to wash your hands often and disinfect surfaces that you touch. And be sure to wear a mask when you're around other people. This is also good advice if you think you have the flu. COVID-19 and the flu have these symptoms in common:  · Fever or chills  · Cough  · Shortness of breath  · Fatigue (tiredness)  · Sore throat  · Runny or stuffy nose  · Muscle and body aches  · Headache  · Vomiting and diarrhea (more common in children than adults)  COVID-19 has another symptom that also may occur:  · New loss of taste or smell  COVID-19 symptoms may appear from 2 to 14 days after infection. Flu symptoms usually appear 1 to 4 days after infection. Why should you get a flu shot during the COVID-19 pandemic? It's important to get your yearly flu vaccine. Both the flu and COVID-19 can be active at the same time. You can get sick with both infections at once. And having both may make you more sick than getting just one. The flu vaccine won't protect you from COVID-19. But it can help prevent the flu or reduce its symptoms. If fewer people get very ill with the flu, this will help free up medical resources that are needed for COVID-19 patients. Where can you learn more? Go to http://www.[employer].com/  Enter C123 in the search box to learn more about \"Learning About COVID-19 and Flu Symptoms. \"  Current as of: March 26, 2021               Content Version: 13.0  © 2146-5359 Healthwise, [employer]. Care instructions adapted under license by [email or web address] (which disclaims liability or warranty for this information).  If you have questions about a medical condition or this instruction, always ask your healthcare professional. [address] any warranty or liability for your use of this information. Patient Education        Sleep Apnea: Care Instructions  Overview     Sleep apnea means that you frequently stop breathing for 10 seconds or longer during sleep. It can be mild to severe, based on the number of times an hour that you stop breathing. Blocked or narrowed airways in your nose, mouth, or throat can cause sleep apnea. Your airway can become blocked when your throat muscles and tongue relax during sleep. You can help treat sleep apnea at home by making lifestyle changes. You also can use a CPAP breathing machine that keeps tissues in the throat from blocking your airway. Or your doctor may suggest that you use a breathing device while you sleep. It helps keep your airway open. This could be a device that you put in your mouth. In some cases, surgery may be needed to remove enlarged tissues in the throat. Follow-up care is a key part of your treatment and safety. Be sure to make and go to all appointments, and call your doctor if you are having problems. It's also a good idea to know your test results and keep a list of the medicines you take. How can you care for yourself at home? · Lose weight, if needed. · Sleep on your side. It may help mild apnea. · Avoid alcohol and medicines such as sleeping pills, opioids, or sedatives before bed. · Don't smoke. If you need help quitting, talk to your doctor. · Prop up the head of your bed. · Treat breathing problems, such as a stuffy nose, that are caused by a cold or allergies. · Try a continuous positive airway pressure (CPAP) breathing machine if your doctor recommends it. · If CPAP doesn't work for you, ask your doctor if you can try other masks, settings, or breathing machines. · Try oral breathing devices or other nasal devices.   · Talk to your doctor if your nose feels dry or bleeds, or if it gets runny or stuffy when you use a breathing machine. · Tell your doctor if you're sleepy during the day and it affects your daily life. Don't drive or operate machinery when you're drowsy. When should you call for help? Watch closely for changes in your health, and be sure to contact your doctor if:    · You still have sleep apnea even though you have made lifestyle changes.     · You are thinking of trying a device such as CPAP.     · You are having problems using a CPAP or similar machine.     · You are still sleepy during the day, and it affects your daily life. Where can you learn more? Go to http://www.gray.com/  Enter J936 in the search box toPatient Education       Baclofen (By mouth)   Baclofen (JAYDEN-steve-fen)  Treats muscle spasms. Brand Name(s):   There may be other brand names for this medicine. When This Medicine Should Not Be Used: You should not use this medicine if you have had an allergic reaction to baclofen. How to Use This Medicine:   Tablet  · Your doctor will tell you how much of this medicine to take and how often. Your dose may need to be changed several times in order to find out what works best for you. Do not take more medicine or take it more often than your doctor tells you to. If a dose is missed:   · If you miss a dose or forget to take your medicine, take it as soon as you can. If it is almost time for your next dose, wait until then to take the medicine and skip the missed dose. · Do not use extra medicine to make up for a missed dose. How to Store and Dispose of This Medicine:   · Store the medicine at room temperature in a closed container, away from heat, moisture, and direct light. · Ask your pharmacist, doctor, or health caregiver about the best way to dispose of any outdated medicine or medicine no longer needed. · Keep all medicine away from children and never share your medicine with anyone.   Drugs and Foods to Avoid:   Ask your doctor or pharmacist before using any other medicine, including over-the-counter medicines, vitamins, and herbal products. · Make sure your doctor knows if you are using any medicines that make you sleepy. These include sleeping pills, cold and allergy medicine, narcotic pain relievers, and sedatives. Warnings While Using This Medicine:   · Make sure your doctor knows if you are pregnant or breastfeeding, or if you have kidney disease, epilepsy, diabetes, or have had a stroke. · Do not stop using this medicine suddenly without asking your doctor. You may need to slowly decrease your dose before stopping it completely. · This medicine may make you dizzy or drowsy. Avoid driving, using machines, or doing anything else that could be dangerous if you are not alert. Possible Side Effects While Using This Medicine:   Call your doctor right away if you notice any of these side effects:  · Increase in how much or how often you urinate  · Lightheadedness or fainting  · Muscles weakness, trouble breathing, trouble seeing  · Seizures  · Severe tiredness or weakness  If you notice these less serious side effects, talk with your doctor:   · Confusion  · Headache  · Nausea, constipation  · Skin rash or itching  · Swelling in your ankles  · Trouble sleeping  · Weakness  If you notice other side effects that you think are caused by this medicine, tell your doctor. Call your doctor for medical advice about side effects. You may report side effects to FDA at 4-684-FDA-1122  © 2017 [facility] Information is for End User's use only and may not be sold, redistributed or otherwise used for commercial purposes. The above information is an  only. It is not intended as medical advice for individual conditions or treatments. Talk to your doctor, nurse or pharmacist before following any medical regimen to see if it is safe and effective for you. learn more about \"Sleep Apnea: Care Instructions. \"  Current as of: July 6, 2021               Content Version: 13.0  © 2006-2021 [employer]. Care instructions adapted under license by [employer] (which disclaims liability or warranty for this information). If you have questions about a medical condition or this instruction, always ask your healthcare professional. [address] any warranty or liability for your use of this information. Patient Education   Dexamethasone (By mouth)   Dexamethasone (dex-a-METH-a-sone)  Treats symptoms of several diseases and conditions. This medicine is a corticosteroid. Brand Name(s): Cushings Syndrome Diagnostic, DexPak, DexPak 10 Day TaperPak, DexPak 13 Day TaperPak, DexPak 6 Day TaperPak, Dexamethasone Intensol, LoCort, ZonaCort   There may be other brand names for this medicine. When This Medicine Should Not Be Used: This medicine is not right for everyone. Do not use it if you had an allergic reaction to dexamethasone, or if you have a fungal infection. How to Use This Medicine:   Liquid, Tablet  · Take your medicine as directed. Your dose may need to be changed several times to find what works best for you. · It is best to take this medicine with food or milk. · Oral liquid: Measure the oral liquid medicine with a marked measuring spoon, oral syringe, or medicine cup. · Missed dose:   ¨ If your schedule is one dose every other day and you cannot use the missed dose until late in the day, wait until the next morning to use your medicine. Then skip a day and go back to your regular schedule. ¨ If your schedule is one dose every day, use the missed dose as soon as you can. Then go back to your regular schedule. ¨ If your schedule is more than one dose every day, use the missed dose as soon as you can. If it is almost time for your next dose, use two doses at that time. Then go back to your regular schedule.   · Store the medicine in a closed container at room temperature, away from heat, moisture, and direct light. Drugs and Foods to Avoid:   Ask your doctor or pharmacist before using any other medicine, including over-the-counter medicines, vitamins, and herbal products. · Some foods and medicines can affect how dexamethasone works. Tell your doctor if you are using any of the following:  ¨ Aminoglutethimide, amphotericin B, carbamazepine, cholestyramine, cyclosporine, digoxin, indinavir, indomethacin, ketoconazole, phenobarbital, phenytoin, rifampin, or thalidomide  ¨ Antibiotic (including azithromycin, clarithromycin, erythromycin)  ¨ Birth control pills  ¨ Blood thinner (including warfarin)  ¨ Diuretic (water pill)  ¨ Insulin and other diabetes medicine  ¨ NSAID pain or arthritis medicine (including aspirin, celecoxib, diclofenac, naproxen)  ¨ Potassium supplement  · Do not drink alcohol while you are using this medicine. · This medicine may interfere with vaccines. Ask your doctor before you get a flu shot or any other vaccines. Warnings While Using This Medicine:   · Tell your doctor if you are pregnant or breastfeeding, or if you have kidney problems, liver disease, diabetes, glaucoma, herpes infection of the eye, allergies, myasthenia gravis, osteoporosis, stomach or bowel problems, or thyroid problems. Tell your doctor if you have congestive heart failure, high blood pressure, or a recent heart attack. Tell your doctor if you have any type of infection or a history of depression or mental problems. · This medicine may cause the following problems:  ¨ High blood pressure, retaining water, changes in salt or potassium levels in your body  ¨ Cataracts or glaucoma (with long-term use)  ¨ Bone loss (with long-term use)  ¨ Mood or behavior changes  ¨ Slow growth in children (with long-term use)  · Do not stop using this medicine suddenly. Your doctor will need to slowly decrease your dose before you stop it completely. · This medicine could cause you to get infections more easily.  Tell your doctor right away if you are exposed to chicken pox, measles, or any other serious infection. Tell your doctor if you had a serious infection in the past, such as tuberculosis. · Tell your doctor about any extra stress or anxiety in your life. Your dose might need to be changed for a short time. · Make sure any doctor or dentist who treats you knows that you are using this medicine. · Your doctor will check your progress and the effects of this medicine at regular visits. Keep all appointments. · Keep all medicine out of the reach of children. Never share your medicine with anyone. Possible Side Effects While Using This Medicine:   Call your doctor right away if you notice any of these side effects:  · Allergic reaction: Itching or hives, swelling in your face or hands, swelling or tingling in your mouth or throat, chest tightness, trouble breathing  · Changes in vision, trouble seeing, eye pain  · Dark freckles, skin changes, coldness, weakness, tiredness, weight loss  · Depression, unusual thoughts, feelings, or behaviors, trouble sleeping  · Fast or slow, pounding heartbeat  · Fever, chills, cough, sore throat, body aches  · Rapid weight gain, swelling in your hands, ankles, or feet  · Severe stomach pain, nausea, vomiting, or red or black stools  · Trouble breathing  · Trouble urinating  · Worsened joint pain, swelling, or stiffness  If you notice these less serious side effects, talk with your doctor:   · Round, puffy face  · Weight gain around your neck, upper back, breast, face, or waist  If you notice other side effects that you think are caused by this medicine, tell your doctor. Call your doctor for medical advice about side effects. You may report side effects to FDA at 9-888-FDA-7653  © 2017 [facility] Information is for End User's use only and may not be sold, redistributed or otherwise used for commercial purposes. The above information is an  only.  It is not intended as medical advice for individual conditions or treatments. Talk to your doctor, nurse or pharmacist before following any medical regimen to see if it is safe and effective for you. Patient Education   Famotidine (By mouth)   Famotidine (wjj-RH-rs-star)  Treats ulcers, gastroesophageal reflux disease (GERD), and conditions that cause the stomach to produce too much stomach acid. Also treats heartburn caused by acid indigestion. Brand Name(s): Acid Controller, Acid Reducer, [facility] Acid Reducer, Good Sense Acid Reducer, Heartburn Relief, Leader Acid Reducer, Pepcid, Pepcid AC, Quality Choice Acid Controller, [employer] Acid Reducer, [employer] Famotidine Acid Reducer, TopCare Acid Reducer   There may be other brand names for this medicine. When This Medicine Should Not Be Used: You should not use this medicine if you have had an allergic reaction to famotidine or to similar medicines such as ranitidine (Zantac®), cimetidine (Tagamet®), or nizatidine (Axid®). How to Use This Medicine:   Tablet, Chewable Tablet, Dissolving Tablet, Liquid  · Your doctor will tell you how much medicine to use. Do not use more than directed. · Follow the instructions on the medicine label if you are using this medicine without a prescription. · The chewable tablet must be chewed completely before you swallow it. · If you are using the disintegrating tablet, make sure your hands are dry before you handle the tablet. Do not open the blister pack that contains the tablet until you are ready to take it. Remove the tablet from the blister pack by peeling back the foil, then taking the tablet out. Do not push the tablet through the foil. Place the tablet in your mouth. It should melt within 2 minutes. Swallow after the tablet has melted. · Shake the oral liquid medicine for 5 to 10 seconds before each use. Measure the medicine with a marked measuring spoon or medicine cup.   If a dose is missed:   · Take a dose as soon as you remember. If it is almost time for your next dose, wait until then and take a regular dose. Do not take extra medicine to make up for a missed dose. How to Store and Dispose of This Medicine:   · Store the medicine in a closed container at room temperature, away from heat, moisture, and direct light. Do not freeze the oral liquid. · Ask your pharmacist, doctor, or health caregiver about the best way to dispose of any outdated medicine or medicine no longer needed. Throw away any unused oral liquid that is more than 3month old. · Keep all medicine out of the reach of children. Never share your medicine with anyone. Drugs and Foods to Avoid:      Ask your doctor or pharmacist before using any other medicine, including over-the-counter medicines, vitamins, and herbal products. Warnings While Using This Medicine:   · Make sure your doctor knows if you are pregnant or breastfeeding, or if you have kidney disease or liver disease. · This medicine might contain phenylalanine (aspartame). This is only a concern if you have a disorder called phenylketonuria (a problem with amino acids). If you have this condition, talk to your doctor before using this medicine. · Call your doctor if your symptoms do not improve or if they get worse. Possible Side Effects While Using This Medicine:   Call your doctor right away if you notice any of these side effects:  · Allergic reaction: Itching or hives, swelling in your face or hands, swelling or tingling in your mouth or throat, chest tightness, trouble breathing  · Blistering, peeling, or red skin rash. · Dark-colored urine or pale stools. · Fast, pounding, or uneven heartbeat. · Fever, chills, cough, sore throat, and body aches. · Seizures. · Unusual bleeding, bruising, or weakness. · Yellowing of your skin or the whites of your eyes.   If you notice these less serious side effects, talk with your doctor:   · Constipation, diarrhea, or upset stomach. · Headache or dizziness. · Nausea or vomiting. If you notice other side effects that you think are caused by this medicine, tell your doctor. Call your doctor for medical advice about side effects. You may report side effects to FDA at 3-557-GBV-6318  © 2017 [facility][note 3] Information is for End User's use only and may not be sold, redistributed or otherwise used for commercial purposes. The above information is an  only. It is not intended as medical advice for individual conditions or treatments. Talk to your doctor, nurse or pharmacist before following any medical regimen to see if it is safe and effective for you.

## 2023-12-10 NOTE — PROGRESS NOTES
placed in back of commode, for possible stool specimen,c/o neck pain with some dizziness after having a coughing episode, states \"I have a hx of a bad neck injury, and I think when I coughed I snapped or pulled something in my neck, because it hurts\". Reports takes Tylenol for pain at home. Sarah Sloan RN assuming care, notified. nicola